# Patient Record
Sex: MALE | Race: WHITE | NOT HISPANIC OR LATINO | Employment: FULL TIME | URBAN - METROPOLITAN AREA
[De-identification: names, ages, dates, MRNs, and addresses within clinical notes are randomized per-mention and may not be internally consistent; named-entity substitution may affect disease eponyms.]

---

## 2017-02-14 RX ORDER — HYDROCHLOROTHIAZIDE 12.5 MG/1
TABLET ORAL
Qty: 30 TABLET | Refills: 3 | Status: SHIPPED | OUTPATIENT
Start: 2017-02-14 | End: 2017-11-03 | Stop reason: SDUPTHER

## 2017-11-03 ENCOUNTER — OFFICE VISIT (OUTPATIENT)
Dept: INTERNAL MEDICINE | Age: 57
End: 2017-11-03

## 2017-11-03 VITALS
WEIGHT: 196.4 LBS | RESPIRATION RATE: 12 BRPM | SYSTOLIC BLOOD PRESSURE: 160 MMHG | DIASTOLIC BLOOD PRESSURE: 90 MMHG | HEART RATE: 72 BPM | HEIGHT: 70 IN | BODY MASS INDEX: 28.12 KG/M2 | OXYGEN SATURATION: 98 %

## 2017-11-03 DIAGNOSIS — E78.2 MIXED HYPERLIPIDEMIA: ICD-10-CM

## 2017-11-03 DIAGNOSIS — R97.20 ELEVATED PSA: ICD-10-CM

## 2017-11-03 DIAGNOSIS — I10 BENIGN ESSENTIAL HYPERTENSION: Primary | ICD-10-CM

## 2017-11-03 LAB
ALBUMIN SERPL-MCNC: 4.5 G/DL (ref 3.5–5.2)
ALBUMIN/GLOB SERPL: 1.7 G/DL
ALP SERPL-CCNC: 64 U/L (ref 39–117)
ALT SERPL-CCNC: 25 U/L (ref 1–41)
AST SERPL-CCNC: 22 U/L (ref 1–40)
BILIRUB SERPL-MCNC: 0.9 MG/DL (ref 0.1–1.2)
BUN SERPL-MCNC: 22 MG/DL (ref 6–20)
BUN/CREAT SERPL: 23.7 (ref 7–25)
CALCIUM SERPL-MCNC: 9.8 MG/DL (ref 8.6–10.5)
CHLORIDE SERPL-SCNC: 102 MMOL/L (ref 98–107)
CHOLEST SERPL-MCNC: 202 MG/DL (ref 0–200)
CO2 SERPL-SCNC: 26.8 MMOL/L (ref 22–29)
CREAT SERPL-MCNC: 0.93 MG/DL (ref 0.76–1.27)
GFR SERPLBLD CREATININE-BSD FMLA CKD-EPI: 101 ML/MIN/1.73
GFR SERPLBLD CREATININE-BSD FMLA CKD-EPI: 83 ML/MIN/1.73
GLOBULIN SER CALC-MCNC: 2.6 GM/DL
GLUCOSE SERPL-MCNC: 83 MG/DL (ref 65–99)
HDLC SERPL-MCNC: 48 MG/DL (ref 40–60)
LDLC SERPL CALC-MCNC: 125 MG/DL (ref 0–100)
POTASSIUM SERPL-SCNC: 4.5 MMOL/L (ref 3.5–5.2)
PROT SERPL-MCNC: 7.1 G/DL (ref 6–8.5)
PSA SERPL-MCNC: 4.14 NG/ML (ref 0–4)
SODIUM SERPL-SCNC: 141 MMOL/L (ref 136–145)
TRIGL SERPL-MCNC: 147 MG/DL (ref 0–150)
VLDLC SERPL CALC-MCNC: 29.4 MG/DL (ref 5–40)

## 2017-11-03 PROCEDURE — 99214 OFFICE O/P EST MOD 30 MIN: CPT | Performed by: INTERNAL MEDICINE

## 2017-11-03 RX ORDER — HYDROCHLOROTHIAZIDE 12.5 MG/1
12.5 TABLET ORAL DAILY
Qty: 90 TABLET | Refills: 1 | Status: SHIPPED | OUTPATIENT
Start: 2017-11-03 | End: 2018-02-27 | Stop reason: SDUPTHER

## 2017-11-03 NOTE — PROGRESS NOTES
"  Grayson Ortega is a 57 y.o. male who presents with   Chief Complaint   Patient presents with   • Hypertension     This is the first visit in the past year for this patient.  He has not been on his hydrochlorothiazide he says for 3 months because he \"ran out\".  He checks his blood pressure at home 1 week out of the month and during that time it ranges [id number] he says.    • Hyperlipidemia     Checkup; lab updates   • Elevated PSA     As above.  The patient had a PSA of 4.5 in 2012 when we were at [facility].  He never had it looked into and has not had any blood test since.  He is having some diminished urine flow at night when he goes to urinate he says.   .    Hypertension   This is a chronic problem. The current episode started more than 1 year ago. The problem has been waxing and waning since onset. The problem is uncontrolled. Past treatments include diuretics (He had been on hydrochlorothiazide 12.5 mg in the past but by his own admission he has not been on any for the past 3 months because he ran out.). Compliance problems: Compliance problem with usage as noted above.    Hyperlipidemia   This is a chronic problem. The current episode started more than 1 year ago. The problem is uncontrolled. He is currently on no antihyperlipidemic treatment.      Elevated PSA: History as outlined above.    The following portions of the patient's history were reviewed and updated as appropriate: allergies, current medications, past medical history and problem list.    Review of Systems   Constitutional: Negative.    HENT: Negative.    Eyes: Negative.    Respiratory: Negative.    Cardiovascular: Negative.    Genitourinary: Negative.    Musculoskeletal: Negative.    Skin: Negative.    Neurological: Negative.    Psychiatric/Behavioral: Negative.        Objective   Physical Exam   Constitutional: He is oriented to person, place, and time. He appears well-developed and well-nourished. No distress.   HENT:   Head: " Normocephalic and atraumatic.   Eyes: Conjunctivae and EOM are normal. Pupils are equal, round, and reactive to light.   Neck: Normal range of motion. Neck supple. No thyromegaly present.   Neck exam negative.  Carotid auscultation normal-no bruits heard.   Cardiovascular: Normal rate, regular rhythm, normal heart sounds and intact distal pulses.  Exam reveals no gallop and no friction rub.    No murmur heard.  Pulmonary/Chest: Effort normal and breath sounds normal. No respiratory distress. He has no wheezes. He has no rales. He exhibits no tenderness.   Neurological: He is alert and oriented to person, place, and time.   Psychiatric: He has a normal mood and affect. His behavior is normal. Judgment and thought content normal.   Nursing note and vitals reviewed.      Assessment/Plan   Grayson was seen today for hypertension, hyperlipidemia and elevated psa.    Diagnoses and all orders for this visit:    Benign essential hypertension  -     Comprehensive Metabolic Panel    Mixed hyperlipidemia  -     Comprehensive Metabolic Panel  -     Lipid Panel    Elevated PSA  -     PSA      Plan:  #1 hypertension: He will resume hydrochlorothiazide, 12.5 mg daily.  We'll see him in 4 weeks for a follow-up blood pressure check and he will continue to monitor his blood pressures at home but will increase the frequency to twice a day over the next month.  Patient made aware of stroke risks with persistently elevated blood pressure.    #2 hyperlipidemia: Labs as above.  Treatment pending lab results.    #3 elevated PSA: PSA 4.5 in 2012 as noted from the history above.  We'll check PSA today.    The patient declines a digital rectal exam and is aware of the potential for prostate cancer with elevation of the PSA.    The patient has not had a screening colonoscopy.  The patient refuses to have one at this time.  The patient is aware of the risks of undiagnosed colon cancer including GI bleed, bowel obstruction, bowel perforation,  metastatic colon cancer and death.  The patient voices an understanding of these risks but also voices a willingness to accept those risks based on the current decision to decline a colonoscopy.

## 2017-11-05 DIAGNOSIS — R97.20 ELEVATED PSA: Primary | ICD-10-CM

## 2017-11-06 NOTE — PROGRESS NOTES
PSA is abnormal at 4.1. Would suggest Urology evaluation. An order has been entered for that referral to Dr Devin Cabral. All other abs are within normal / acceptable ranges. Continue all current meds as they are. A followup visit to be seen for BP reevaluation advised for 4 weeks as discussed..

## 2017-12-04 ENCOUNTER — OFFICE VISIT (OUTPATIENT)
Dept: INTERNAL MEDICINE | Age: 57
End: 2017-12-04

## 2017-12-04 VITALS
HEIGHT: 70 IN | WEIGHT: 198 LBS | DIASTOLIC BLOOD PRESSURE: 74 MMHG | SYSTOLIC BLOOD PRESSURE: 136 MMHG | HEART RATE: 84 BPM | OXYGEN SATURATION: 97 % | TEMPERATURE: 96 F | BODY MASS INDEX: 28.35 KG/M2

## 2017-12-04 DIAGNOSIS — I10 BENIGN ESSENTIAL HYPERTENSION: Primary | ICD-10-CM

## 2017-12-04 PROCEDURE — 99213 OFFICE O/P EST LOW 20 MIN: CPT | Performed by: INTERNAL MEDICINE

## 2017-12-04 NOTE — PROGRESS NOTES
"  Grayson Ortega is a 57 y.o. male who presents with   Chief Complaint   Patient presents with   • Hypertension     Recheck blood pressure.  160/90 in month ago.  He started back on hydrochlorothiazide 12.5 mg daily.  Blood pressure at home since then around 127/77 he says.   .    Hypertension   This is a chronic problem. The current episode started more than 1 year ago. The problem has been waxing and waning since onset. The problem is controlled. Past treatments include diuretics. The current treatment provides moderate improvement. There are no compliance problems.         The following portions of the patient's history were reviewed and updated as appropriate: current medications, past medical history and problem list.    Review of Systems   Constitutional: Negative.    Respiratory: Negative.    Cardiovascular: Negative.    Neurological: Negative.    Psychiatric/Behavioral: Negative.        Objective   Physical Exam   Constitutional: He is oriented to person, place, and time. He appears well-developed and well-nourished.   HENT:   Head: Normocephalic and atraumatic.   Eyes: EOM are normal. Pupils are equal, round, and reactive to light.   Cardiovascular: Normal rate.    Pulmonary/Chest: Effort normal.   Neurological: He is alert and oriented to person, place, and time.   Psychiatric: He has a normal mood and affect. His behavior is normal. Judgment and thought content normal.   Nursing note and vitals reviewed.      Assessment/Plan   Grayson was seen today for hypertension.    Diagnoses and all orders for this visit:    Benign essential hypertension        Plan: Blood pressure seems to have responded favorably to the resumption of hydrochlorothiazide 12.5 mg daily.  Continue all treatment as prescribed.  Follow-up checkup/lab updates in May      The patient also reports that he is to see urology-Dr. Cabral this month \"before [facility]\" to evaluate the PSA of 4.1 and the difficulty with urination that he describes.     "

## 2018-02-27 RX ORDER — HYDROCHLOROTHIAZIDE 12.5 MG/1
TABLET ORAL
Qty: 30 TABLET | Refills: 0 | Status: SHIPPED | OUTPATIENT
Start: 2018-02-27 | End: 2018-03-15 | Stop reason: SDUPTHER

## 2018-03-15 DIAGNOSIS — I10 BENIGN ESSENTIAL HYPERTENSION: Primary | ICD-10-CM

## 2018-03-15 RX ORDER — HYDROCHLOROTHIAZIDE 12.5 MG/1
12.5 TABLET ORAL DAILY
Qty: 30 TABLET | Refills: 0 | Status: SHIPPED | OUTPATIENT
Start: 2018-03-15 | End: 2018-11-21 | Stop reason: SDUPTHER

## 2018-07-02 RX ORDER — HYDROCHLOROTHIAZIDE 12.5 MG/1
TABLET ORAL
Qty: 30 TABLET | Refills: 0 | Status: SHIPPED | OUTPATIENT
Start: 2018-07-02 | End: 2018-08-02 | Stop reason: SDUPTHER

## 2018-08-02 RX ORDER — HYDROCHLOROTHIAZIDE 12.5 MG/1
TABLET ORAL
Qty: 30 TABLET | Refills: 0 | Status: SHIPPED | OUTPATIENT
Start: 2018-08-02 | End: 2018-09-03 | Stop reason: SDUPTHER

## 2018-09-04 RX ORDER — HYDROCHLOROTHIAZIDE 12.5 MG/1
TABLET ORAL
Qty: 30 TABLET | Refills: 0 | Status: SHIPPED | OUTPATIENT
Start: 2018-09-04 | End: 2018-10-05 | Stop reason: SDUPTHER

## 2018-10-05 RX ORDER — HYDROCHLOROTHIAZIDE 12.5 MG/1
TABLET ORAL
Qty: 30 TABLET | Refills: 0 | Status: SHIPPED | OUTPATIENT
Start: 2018-10-05 | End: 2018-11-08 | Stop reason: SDUPTHER

## 2018-11-08 RX ORDER — HYDROCHLOROTHIAZIDE 12.5 MG/1
TABLET ORAL
Qty: 15 TABLET | Refills: 0 | Status: SHIPPED | OUTPATIENT
Start: 2018-11-08 | End: 2019-03-21 | Stop reason: ALTCHOICE

## 2018-11-16 ENCOUNTER — OFFICE VISIT (OUTPATIENT)
Dept: INTERNAL MEDICINE | Age: 58
End: 2018-11-16

## 2018-11-16 VITALS
TEMPERATURE: 97 F | HEART RATE: 95 BPM | DIASTOLIC BLOOD PRESSURE: 80 MMHG | OXYGEN SATURATION: 99 % | SYSTOLIC BLOOD PRESSURE: 148 MMHG | WEIGHT: 195 LBS | HEIGHT: 70 IN | RESPIRATION RATE: 13 BRPM | BODY MASS INDEX: 27.92 KG/M2

## 2018-11-16 DIAGNOSIS — E78.2 MIXED HYPERLIPIDEMIA: ICD-10-CM

## 2018-11-16 DIAGNOSIS — I10 BENIGN ESSENTIAL HYPERTENSION: Primary | ICD-10-CM

## 2018-11-16 DIAGNOSIS — L98.9 SKIN LESION OF FACE: ICD-10-CM

## 2018-11-16 LAB
ALBUMIN SERPL-MCNC: 4.6 G/DL (ref 3.5–5.2)
ALBUMIN/GLOB SERPL: 1.6 G/DL
ALP SERPL-CCNC: 68 U/L (ref 39–117)
ALT SERPL-CCNC: 25 U/L (ref 1–41)
AST SERPL-CCNC: 24 U/L (ref 1–40)
BILIRUB SERPL-MCNC: 0.8 MG/DL (ref 0.1–1.2)
BUN SERPL-MCNC: 18 MG/DL (ref 8–23)
BUN/CREAT SERPL: 18.2 (ref 7–25)
CALCIUM SERPL-MCNC: 10.2 MG/DL (ref 8.6–10.5)
CHLORIDE SERPL-SCNC: 102 MMOL/L (ref 98–107)
CHOLEST SERPL-MCNC: 200 MG/DL (ref 0–200)
CO2 SERPL-SCNC: 29.9 MMOL/L (ref 22–29)
CREAT SERPL-MCNC: 0.99 MG/DL (ref 0.76–1.27)
GLOBULIN SER CALC-MCNC: 2.9 GM/DL
GLUCOSE SERPL-MCNC: 90 MG/DL (ref 65–99)
HDLC SERPL-MCNC: 59 MG/DL (ref 40–60)
LDLC SERPL CALC-MCNC: 117 MG/DL (ref 0–100)
POTASSIUM SERPL-SCNC: 4.6 MMOL/L (ref 3.5–5.2)
PROT SERPL-MCNC: 7.5 G/DL (ref 6–8.5)
SODIUM SERPL-SCNC: 142 MMOL/L (ref 136–145)
TRIGL SERPL-MCNC: 118 MG/DL (ref 0–150)
VLDLC SERPL CALC-MCNC: 23.6 MG/DL (ref 5–40)

## 2018-11-16 PROCEDURE — 99214 OFFICE O/P EST MOD 30 MIN: CPT | Performed by: INTERNAL MEDICINE

## 2018-11-16 RX ORDER — ALFUZOSIN HYDROCHLORIDE 10 MG/1
10 TABLET, EXTENDED RELEASE ORAL DAILY
Qty: 30 TABLET | Refills: 0 | COMMUNITY
Start: 2018-11-16 | End: 2019-05-30

## 2018-11-16 NOTE — PROGRESS NOTES
"  Grayson Ortega is a 58 y.o. male who presents with   Chief Complaint   Patient presents with   • Hypertension     Blood pressure at home [id number] he says.  His daughter is a nurse and takes it for him.    • Hyperlipidemia   • \"Spot on nose\"     Present for several months.  Asymptomatic.  Feels rough.  Will not go away.   • Groin itch   .    58-year-old male.  Six-month checkup/lab update visit.  Has a spot on his nose that has been there for several months and has some itching in his genitalia/groin region.  Also he says that he has not been seen since November 2017.  He did see Dr. Cabral (urology) for elevation of the PSA on last visit.  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 Cabral felt that the PSA elevation might have been from those 2 factors.  He states that he has minimized all of that and as of 1 month ago the PSA was \"less than 4\".  He said Dr. Cabral told him to \"follow up in one year\".      Hypertension   This is a chronic problem. The current episode started more than 1 year ago. The problem has been waxing and waning since onset. Condition status: Based on his home readings, his blood pressure is acceptable.  His readings in the office are consistently elevated which suggests he may have \"white coat hypertension\". Current antihypertension treatment includes diuretics. The current treatment provides moderate improvement. There are no compliance problems.    Hyperlipidemia   This is a chronic problem. The current episode started more than 1 year ago. The problem is controlled. He is currently on no antihyperlipidemic treatment.        The following portions of the patient's history were reviewed and updated as appropriate: allergies, current medications, past medical history and problem list.    Review of Systems   Constitutional: Negative.    HENT: Negative.    Eyes: Negative.    Respiratory: Negative.    Cardiovascular: Negative.    Genitourinary: Negative.  " "  Musculoskeletal: Negative.    Skin: Negative.    Neurological: Negative.    Psychiatric/Behavioral: Negative.        Objective   Physical Exam   Constitutional: He is oriented to person, place, and time. He appears well-developed and well-nourished. No distress.   HENT:   Head: Normocephalic and atraumatic.   Eyes: Conjunctivae and EOM are normal. Pupils are equal, round, and reactive to light.   Neck: Normal range of motion. Neck supple. No thyromegaly present.   Neck exam negative.  Carotid auscultation normal-no bruits heard.   Cardiovascular: Normal rate, regular rhythm, normal heart sounds and intact distal pulses. Exam reveals no gallop and no friction rub.   No murmur heard.  Pulmonary/Chest: Effort normal and breath sounds normal. No respiratory distress. He has no wheezes. He has no rales. He exhibits no tenderness.   Neurological: He is alert and oriented to person, place, and time.   Skin:   On the right side of the bridge of his nose is a 5 mm actinic keratosis-like area that feels rough on palpation.  There is no palpable soreness.   Psychiatric: He has a normal mood and affect. His behavior is normal. Judgment and thought content normal.   Nursing note and vitals reviewed.      Assessment/Plan   Ray was seen today for hypertension, hyperlipidemia, \"spot on nose\" and groin itch.    Diagnoses and all orders for this visit:    Benign essential hypertension  -     Comprehensive Metabolic Panel    Mixed hyperlipidemia  -     Comprehensive Metabolic Panel  -     Lipid Panel    Skin lesion of face      Plan: Labs as above.Today's exam unremarkable for any new problems or events.  Continue all current treatment as prescribed.  A follow-up checkup/lab update visit is advised for 6 months assuming today's labs are all acceptable.    Dermatology evaluation advised for the skin lesion on his nose.  He said his wife sees a dermatology group\" [place] [address]\" and he says \"I will just call and make an " "appointment with them\".  He was advised that if he needs a referral to satisfy insurance requirements then let us know and we will enter that referral for him.         "

## 2018-11-21 DIAGNOSIS — I10 BENIGN ESSENTIAL HYPERTENSION: ICD-10-CM

## 2018-11-21 RX ORDER — HYDROCHLOROTHIAZIDE 12.5 MG/1
12.5 TABLET ORAL DAILY
Qty: 90 TABLET | Refills: 1 | Status: SHIPPED | OUTPATIENT
Start: 2018-11-21 | End: 2019-03-07 | Stop reason: HOSPADM

## 2018-12-28 ENCOUNTER — TELEPHONE (OUTPATIENT)
Dept: INTERNAL MEDICINE | Age: 58
End: 2018-12-28

## 2018-12-28 DIAGNOSIS — S00.31XD ABRASION OF NOSE, SUBSEQUENT ENCOUNTER: Primary | ICD-10-CM

## 2018-12-28 NOTE — TELEPHONE ENCOUNTER
Pt needs a referral for dermatology for the spot on his nose. He was unable to get in where is wife is seen.     Please advise.

## 2019-02-28 ENCOUNTER — APPOINTMENT (OUTPATIENT)
Dept: CT IMAGING | Facility: HOSPITAL | Age: 59
End: 2019-02-28

## 2019-02-28 ENCOUNTER — HOSPITAL ENCOUNTER (EMERGENCY)
Facility: HOSPITAL | Age: 59
Discharge: HOME | End: 2019-02-28
Attending: EMERGENCY MEDICINE | Admitting: EMERGENCY MEDICINE

## 2019-02-28 VITALS
OXYGEN SATURATION: 100 % | HEART RATE: 82 BPM | RESPIRATION RATE: 22 BRPM | TEMPERATURE: 96.9 F | WEIGHT: 195 LBS | HEIGHT: 70 IN | DIASTOLIC BLOOD PRESSURE: 95 MMHG | SYSTOLIC BLOOD PRESSURE: 164 MMHG | BODY MASS INDEX: 27.92 KG/M2

## 2019-02-28 DIAGNOSIS — N20.0 KIDNEY STONE ON RIGHT SIDE: Primary | ICD-10-CM

## 2019-02-28 LAB
ALBUMIN SERPL-MCNC: 4.4 G/DL (ref 3.5–5.2)
ALBUMIN/GLOB SERPL: 1.6 G/DL
ALP SERPL-CCNC: 64 U/L (ref 39–117)
ALT SERPL W P-5'-P-CCNC: 37 U/L (ref 1–41)
ANION GAP SERPL CALCULATED.3IONS-SCNC: 13 MMOL/L
AST SERPL-CCNC: 30 U/L (ref 1–40)
BASOPHILS # BLD AUTO: 0.04 10*3/MM3 (ref 0–0.2)
BASOPHILS NFR BLD AUTO: 0.4 % (ref 0–1.5)
BILIRUB SERPL-MCNC: 0.6 MG/DL (ref 0.1–1.2)
BUN BLD-MCNC: 24 MG/DL (ref 8–23)
BUN/CREAT SERPL: 21.4 (ref 7–25)
CALCIUM SPEC-SCNC: 9.6 MG/DL (ref 8.6–10.5)
CHLORIDE SERPL-SCNC: 100 MMOL/L (ref 98–107)
CO2 SERPL-SCNC: 24 MMOL/L (ref 22–29)
CREAT BLD-MCNC: 1.12 MG/DL (ref 0.76–1.27)
DEPRECATED RDW RBC AUTO: 41.1 FL (ref 37–54)
EOSINOPHIL # BLD AUTO: 0.13 10*3/MM3 (ref 0–0.4)
EOSINOPHIL NFR BLD AUTO: 1.3 % (ref 0.3–6.2)
ERYTHROCYTE [DISTWIDTH] IN BLOOD BY AUTOMATED COUNT: 12.8 % (ref 12.3–15.4)
GFR SERPL CREATININE-BSD FRML MDRD: 67 ML/MIN/1.73
GLOBULIN UR ELPH-MCNC: 2.8 GM/DL
GLUCOSE BLD-MCNC: 114 MG/DL (ref 65–99)
HCT VFR BLD AUTO: 47.9 % (ref 37.5–51)
HGB BLD-MCNC: 16.4 G/DL (ref 13–17.7)
IMM GRANULOCYTES # BLD AUTO: 0.05 10*3/MM3 (ref 0–0.05)
IMM GRANULOCYTES NFR BLD AUTO: 0.5 % (ref 0–0.5)
LIPASE SERPL-CCNC: 17 U/L (ref 13–60)
LYMPHOCYTES # BLD AUTO: 1.53 10*3/MM3 (ref 0.7–3.1)
LYMPHOCYTES NFR BLD AUTO: 15.7 % (ref 19.6–45.3)
MCH RBC QN AUTO: 30.4 PG (ref 26.6–33)
MCHC RBC AUTO-ENTMCNC: 34.2 G/DL (ref 31.5–35.7)
MCV RBC AUTO: 88.7 FL (ref 79–97)
MONOCYTES # BLD AUTO: 0.57 10*3/MM3 (ref 0.1–0.9)
MONOCYTES NFR BLD AUTO: 5.8 % (ref 5–12)
NEUTROPHILS # BLD AUTO: 7.43 10*3/MM3 (ref 1.4–7)
NEUTROPHILS NFR BLD AUTO: 76.3 % (ref 42.7–76)
NRBC BLD AUTO-RTO: 0 /100 WBC (ref 0–0)
PLATELET # BLD AUTO: 223 10*3/MM3 (ref 140–450)
PMV BLD AUTO: 10.2 FL (ref 6–12)
POTASSIUM BLD-SCNC: 4.1 MMOL/L (ref 3.5–5.2)
PROT SERPL-MCNC: 7.2 G/DL (ref 6–8.5)
RBC # BLD AUTO: 5.4 10*6/MM3 (ref 4.14–5.8)
SODIUM BLD-SCNC: 137 MMOL/L (ref 136–145)
WBC NRBC COR # BLD: 9.75 10*3/MM3 (ref 3.4–10.8)

## 2019-02-28 PROCEDURE — 85025 COMPLETE CBC W/AUTO DIFF WBC: CPT | Performed by: EMERGENCY MEDICINE

## 2019-02-28 PROCEDURE — 96374 THER/PROPH/DIAG INJ IV PUSH: CPT

## 2019-02-28 PROCEDURE — 96375 TX/PRO/DX INJ NEW DRUG ADDON: CPT

## 2019-02-28 PROCEDURE — 25010000002 KETOROLAC TROMETHAMINE PER 15 MG: Performed by: EMERGENCY MEDICINE

## 2019-02-28 PROCEDURE — 83690 ASSAY OF LIPASE: CPT | Performed by: EMERGENCY MEDICINE

## 2019-02-28 PROCEDURE — 74176 CT ABD & PELVIS W/O CONTRAST: CPT

## 2019-02-28 PROCEDURE — 25010000002 HYDROMORPHONE PER 4 MG: Performed by: EMERGENCY MEDICINE

## 2019-02-28 PROCEDURE — 80053 COMPREHEN METABOLIC PANEL: CPT | Performed by: EMERGENCY MEDICINE

## 2019-02-28 PROCEDURE — 99283 EMERGENCY DEPT VISIT LOW MDM: CPT

## 2019-02-28 RX ORDER — SODIUM CHLORIDE 0.9 % (FLUSH) 0.9 %
10 SYRINGE (ML) INJECTION AS NEEDED
Status: DISCONTINUED | OUTPATIENT
Start: 2019-02-28 | End: 2019-02-28 | Stop reason: HOSPADM

## 2019-02-28 RX ORDER — HYDROMORPHONE HYDROCHLORIDE 1 MG/ML
0.5 INJECTION, SOLUTION INTRAMUSCULAR; INTRAVENOUS; SUBCUTANEOUS ONCE
Status: COMPLETED | OUTPATIENT
Start: 2019-02-28 | End: 2019-02-28

## 2019-02-28 RX ORDER — ONDANSETRON HYDROCHLORIDE 8 MG/1
8 TABLET, FILM COATED ORAL EVERY 8 HOURS PRN
Qty: 15 TABLET | Refills: 0 | Status: SHIPPED | OUTPATIENT
Start: 2019-02-28 | End: 2019-05-30

## 2019-02-28 RX ORDER — KETOROLAC TROMETHAMINE 30 MG/ML
30 INJECTION, SOLUTION INTRAMUSCULAR; INTRAVENOUS ONCE
Status: COMPLETED | OUTPATIENT
Start: 2019-02-28 | End: 2019-02-28

## 2019-02-28 RX ORDER — HYDROCODONE BITARTRATE AND ACETAMINOPHEN 5; 325 MG/1; MG/1
1 TABLET ORAL EVERY 6 HOURS PRN
Qty: 20 TABLET | Refills: 0 | Status: ON HOLD | OUTPATIENT
Start: 2019-02-28 | End: 2019-03-07

## 2019-02-28 RX ADMIN — KETOROLAC TROMETHAMINE 30 MG: 30 INJECTION, SOLUTION INTRAMUSCULAR at 17:53

## 2019-02-28 RX ADMIN — HYDROMORPHONE HYDROCHLORIDE 0.5 MG: 1 INJECTION, SOLUTION INTRAMUSCULAR; INTRAVENOUS; SUBCUTANEOUS at 18:33

## 2019-03-01 ENCOUNTER — TELEPHONE (OUTPATIENT)
Dept: INTERNAL MEDICINE | Age: 59
End: 2019-03-01

## 2019-03-01 NOTE — TELEPHONE ENCOUNTER
Patient went to the ER, found out he has kidney stones.  ER suggested he follow up with either his Urologist or PCP.  Patient wanted to know if Dr. Brower wanted him to come into the office or follow up with Urology.  Please advise.  Patient can be contacted at [phone]

## 2019-03-06 ENCOUNTER — HOSPITAL ENCOUNTER (OUTPATIENT)
Dept: OTHER | Facility: HOSPITAL | Age: 59
Setting detail: SPECIMEN
Discharge: HOME | End: 2019-03-06
Attending: UROLOGY | Admitting: UROLOGY

## 2019-03-06 ENCOUNTER — HOSPITAL ENCOUNTER (OUTPATIENT)
Facility: HOSPITAL | Age: 59
Setting detail: OBSERVATION
Discharge: HOME | End: 2019-03-07
Attending: EMERGENCY MEDICINE | Admitting: HOSPITALIST

## 2019-03-06 DIAGNOSIS — N39.0 ACUTE UTI: ICD-10-CM

## 2019-03-06 DIAGNOSIS — R33.9 URINARY RETENTION: Primary | ICD-10-CM

## 2019-03-06 DIAGNOSIS — R31.9 HEMATURIA, UNSPECIFIED TYPE: ICD-10-CM

## 2019-03-06 LAB
BACTERIA UR QL AUTO: ABNORMAL /HPF
BILIRUB UR QL STRIP: NEGATIVE
CLARITY UR: ABNORMAL
COLOR UR: ABNORMAL
GLUCOSE UR STRIP-MCNC: ABNORMAL MG/DL
HGB UR QL STRIP.AUTO: ABNORMAL
HYALINE CASTS UR QL AUTO: ABNORMAL /LPF
KETONES UR QL STRIP: ABNORMAL
LEUKOCYTE ESTERASE UR QL STRIP.AUTO: ABNORMAL
NITRITE UR QL STRIP: POSITIVE
PH UR STRIP.AUTO: 7 [PH] (ref 5–8)
PROT UR QL STRIP: ABNORMAL
RBC # UR: ABNORMAL /HPF
REF LAB TEST METHOD: ABNORMAL
SP GR UR STRIP: 1.01 (ref 1–1.03)
SQUAMOUS #/AREA URNS HPF: ABNORMAL /HPF
UROBILINOGEN UR QL STRIP: ABNORMAL
WBC UR QL AUTO: ABNORMAL /HPF

## 2019-03-06 PROCEDURE — 81001 URINALYSIS AUTO W/SCOPE: CPT | Performed by: PHYSICIAN ASSISTANT

## 2019-03-06 PROCEDURE — 99284 EMERGENCY DEPT VISIT MOD MDM: CPT

## 2019-03-06 PROCEDURE — G0378 HOSPITAL OBSERVATION PER HR: HCPCS

## 2019-03-06 PROCEDURE — 51798 US URINE CAPACITY MEASURE: CPT

## 2019-03-06 PROCEDURE — 51702 INSERT TEMP BLADDER CATH: CPT

## 2019-03-06 RX ORDER — OXYCODONE HYDROCHLORIDE AND ACETAMINOPHEN 5; 325 MG/1; MG/1
2 TABLET ORAL ONCE
Status: COMPLETED | OUTPATIENT
Start: 2019-03-06 | End: 2019-03-06

## 2019-03-06 RX ADMIN — OXYCODONE AND ACETAMINOPHEN 2 TABLET: 5; 325 TABLET ORAL at 23:50

## 2019-03-07 VITALS
WEIGHT: 203.3 LBS | SYSTOLIC BLOOD PRESSURE: 111 MMHG | BODY MASS INDEX: 29.11 KG/M2 | RESPIRATION RATE: 18 BRPM | HEART RATE: 79 BPM | TEMPERATURE: 98.3 F | OXYGEN SATURATION: 95 % | HEIGHT: 70 IN | DIASTOLIC BLOOD PRESSURE: 65 MMHG

## 2019-03-07 PROBLEM — R31.0 GROSS HEMATURIA: Status: ACTIVE | Noted: 2019-03-07

## 2019-03-07 PROBLEM — R33.9 URINARY RETENTION: Status: ACTIVE | Noted: 2019-03-07

## 2019-03-07 PROBLEM — N39.0 ACUTE UTI (URINARY TRACT INFECTION): Status: ACTIVE | Noted: 2019-03-07

## 2019-03-07 LAB
ANION GAP SERPL CALCULATED.3IONS-SCNC: 12.9 MMOL/L
BUN BLD-MCNC: 24 MG/DL (ref 8–23)
BUN/CREAT SERPL: 22.2 (ref 7–25)
CALCIUM SPEC-SCNC: 9.3 MG/DL (ref 8.6–10.5)
CHLORIDE SERPL-SCNC: 100 MMOL/L (ref 98–107)
CO2 SERPL-SCNC: 25.1 MMOL/L (ref 22–29)
CREAT BLD-MCNC: 1.08 MG/DL (ref 0.76–1.27)
DEPRECATED RDW RBC AUTO: 40.4 FL (ref 37–54)
ERYTHROCYTE [DISTWIDTH] IN BLOOD BY AUTOMATED COUNT: 12.3 % (ref 12.3–15.4)
GFR SERPL CREATININE-BSD FRML MDRD: 70 ML/MIN/1.73
GLUCOSE BLD-MCNC: 126 MG/DL (ref 65–99)
HCT VFR BLD AUTO: 44.8 % (ref 37.5–51)
HGB BLD-MCNC: 15 G/DL (ref 13–17.7)
MCH RBC QN AUTO: 29.9 PG (ref 26.6–33)
MCHC RBC AUTO-ENTMCNC: 33.5 G/DL (ref 31.5–35.7)
MCV RBC AUTO: 89.2 FL (ref 79–97)
PLATELET # BLD AUTO: 254 10*3/MM3 (ref 140–450)
PMV BLD AUTO: 10.1 FL (ref 6–12)
POTASSIUM BLD-SCNC: 4.2 MMOL/L (ref 3.5–5.2)
RBC # BLD AUTO: 5.02 10*6/MM3 (ref 4.14–5.8)
SODIUM BLD-SCNC: 138 MMOL/L (ref 136–145)
SPECIMEN SOURCE: NORMAL
WBC NRBC COR # BLD: 11.03 10*3/MM3 (ref 3.4–10.8)

## 2019-03-07 PROCEDURE — 87086 URINE CULTURE/COLONY COUNT: CPT | Performed by: PHYSICIAN ASSISTANT

## 2019-03-07 PROCEDURE — G0378 HOSPITAL OBSERVATION PER HR: HCPCS

## 2019-03-07 PROCEDURE — 25010000002 CEFTRIAXONE PER 250 MG: Performed by: PHYSICIAN ASSISTANT

## 2019-03-07 PROCEDURE — 96361 HYDRATE IV INFUSION ADD-ON: CPT

## 2019-03-07 PROCEDURE — 80048 BASIC METABOLIC PNL TOTAL CA: CPT | Performed by: NURSE PRACTITIONER

## 2019-03-07 PROCEDURE — 51702 INSERT TEMP BLADDER CATH: CPT

## 2019-03-07 PROCEDURE — 96365 THER/PROPH/DIAG IV INF INIT: CPT

## 2019-03-07 PROCEDURE — 85027 COMPLETE CBC AUTOMATED: CPT | Performed by: NURSE PRACTITIONER

## 2019-03-07 RX ORDER — SODIUM CHLORIDE 0.9 % (FLUSH) 0.9 %
3 SYRINGE (ML) INJECTION EVERY 12 HOURS SCHEDULED
Status: DISCONTINUED | OUTPATIENT
Start: 2019-03-07 | End: 2019-03-07 | Stop reason: HOSPADM

## 2019-03-07 RX ORDER — FINASTERIDE 5 MG/1
5 TABLET, FILM COATED ORAL DAILY
Status: DISCONTINUED | OUTPATIENT
Start: 2019-03-07 | End: 2019-03-07 | Stop reason: HOSPADM

## 2019-03-07 RX ORDER — CEFDINIR 300 MG/1
300 CAPSULE ORAL 2 TIMES DAILY
Qty: 10 CAPSULE | Refills: 0 | Status: SHIPPED | OUTPATIENT
Start: 2019-03-07 | End: 2019-05-30

## 2019-03-07 RX ORDER — TAMSULOSIN HYDROCHLORIDE 0.4 MG/1
0.4 CAPSULE ORAL NIGHTLY
Status: DISCONTINUED | OUTPATIENT
Start: 2019-03-07 | End: 2019-03-07 | Stop reason: HOSPADM

## 2019-03-07 RX ORDER — ONDANSETRON 2 MG/ML
4 INJECTION INTRAMUSCULAR; INTRAVENOUS EVERY 6 HOURS PRN
Status: DISCONTINUED | OUTPATIENT
Start: 2019-03-07 | End: 2019-03-07 | Stop reason: HOSPADM

## 2019-03-07 RX ORDER — ONDANSETRON 4 MG/1
4 TABLET, FILM COATED ORAL EVERY 6 HOURS PRN
Status: DISCONTINUED | OUTPATIENT
Start: 2019-03-07 | End: 2019-03-07 | Stop reason: HOSPADM

## 2019-03-07 RX ORDER — CEFTRIAXONE SODIUM 1 G/50ML
1 INJECTION, SOLUTION INTRAVENOUS ONCE
Status: COMPLETED | OUTPATIENT
Start: 2019-03-07 | End: 2019-03-07

## 2019-03-07 RX ORDER — SODIUM CHLORIDE 0.9 % (FLUSH) 0.9 %
1-10 SYRINGE (ML) INJECTION AS NEEDED
Status: DISCONTINUED | OUTPATIENT
Start: 2019-03-07 | End: 2019-03-07 | Stop reason: HOSPADM

## 2019-03-07 RX ORDER — CEFDINIR 300 MG/1
300 CAPSULE ORAL 2 TIMES DAILY
Qty: 10 CAPSULE | Refills: 0 | Status: SHIPPED | OUTPATIENT
Start: 2019-03-07 | End: 2019-03-07 | Stop reason: SDUPTHER

## 2019-03-07 RX ORDER — ACETAMINOPHEN 325 MG/1
650 TABLET ORAL EVERY 4 HOURS PRN
Status: DISCONTINUED | OUTPATIENT
Start: 2019-03-07 | End: 2019-03-07 | Stop reason: HOSPADM

## 2019-03-07 RX ORDER — FINASTERIDE 5 MG/1
5 TABLET, FILM COATED ORAL DAILY
Qty: 30 TABLET | Refills: 0 | Status: SHIPPED | OUTPATIENT
Start: 2019-03-08 | End: 2019-05-30

## 2019-03-07 RX ORDER — ONDANSETRON 4 MG/1
4 TABLET, ORALLY DISINTEGRATING ORAL EVERY 6 HOURS PRN
Status: DISCONTINUED | OUTPATIENT
Start: 2019-03-07 | End: 2019-03-07 | Stop reason: HOSPADM

## 2019-03-07 RX ORDER — SODIUM CHLORIDE 9 MG/ML
75 INJECTION, SOLUTION INTRAVENOUS CONTINUOUS
Status: DISCONTINUED | OUTPATIENT
Start: 2019-03-07 | End: 2019-03-07

## 2019-03-07 RX ORDER — L.ACID,PARA/B.BIFIDUM/S.THERM 8B CELL
1 CAPSULE ORAL DAILY
Status: DISCONTINUED | OUTPATIENT
Start: 2019-03-07 | End: 2019-03-07 | Stop reason: HOSPADM

## 2019-03-07 RX ADMIN — CEFTRIAXONE SODIUM 1 G: 1 INJECTION, SOLUTION INTRAVENOUS at 03:08

## 2019-03-07 RX ADMIN — Medication 1 CAPSULE: at 11:05

## 2019-03-07 RX ADMIN — Medication 3 ML: at 05:17

## 2019-03-07 RX ADMIN — FINASTERIDE 5 MG: 5 TABLET, FILM COATED ORAL at 11:05

## 2019-03-07 RX ADMIN — SODIUM CHLORIDE 75 ML/HR: 9 INJECTION, SOLUTION INTRAVENOUS at 05:41

## 2019-03-07 NOTE — PLAN OF CARE
Problem: Patient Care Overview  Goal: Plan of Care Review  Outcome: Ongoing (interventions implemented as appropriate)   03/07/19 0762   Coping/Psychosocial   Plan of Care Reviewed With patient;spouse   Plan of Care Review   Progress no change   OTHER   Outcome Summary Report recvd from Pippa MARTINEZ, pt admitted to 4P from ER. Pt and spouse welcomed and oriented to unit, questions, comments, concerns addressed. Pt arrived to unit with F/C and CBI. Pt NPO, IVF continuous. Pt agrees to notify staff of needs. Continue to monitor and tx per POC and MD orders.        Problem: Pain, Acute (Adult)  Goal: Identify Related Risk Factors and Signs and Symptoms  Outcome: Ongoing (interventions implemented as appropriate)    Goal: Acceptable Pain Control/Comfort Level  Outcome: Ongoing (interventions implemented as appropriate)      Problem: Urine Elimination Impaired (Adult)  Goal: Identify Related Risk Factors and Signs and Symptoms  Outcome: Outcome(s) achieved Date Met: 03/07/19    Goal: Effective or Improved Urinary Elimination  Outcome: Ongoing (interventions implemented as appropriate)    Goal: Effective Containment of Urine  Outcome: Ongoing (interventions implemented as appropriate)    Goal: Reduced Incontinence Episodes  Outcome: Ongoing (interventions implemented as appropriate)

## 2019-03-07 NOTE — ED NOTES
3000 mL bag normal saline instilled into bladder during CBI. Drainage bag emptied. 3050mL dark orange fluid returned. SHARON Larios notified. Second 3000mL bag NS started.             Pippa Cunningham RN  03/07/19 0043

## 2019-03-07 NOTE — CONSULTS
FIRST UROLOGY CONSULT      Patient Identification:  NAME:  Caio Ortega  Age:  59 y.o.   Sex:  male   :  1960   MRN:  [id number]       Chief complaint: Urinary retention    History of present illness:  This is a 59-year-old man who underwent ureteroscopy, laser lithotripsy, and ureteral stent placement yesterday with Dr. Mcnamara. Postoperatively, he was unable to urinate and presented to the ER. A rodriguez catheter was placed, with difficulty, and bright red colored urine was drained. A 16 Fr 3 way was left in place.     The patient is currently comfortable. He reports being told he has a large prostate. No fevers, chills, nausea, vomiting.    Past medical history:  Past Medical History:   Diagnosis Date   • Allergic    • BPH (benign prostatic hypertrophy)    • Difficulty in urination     SOMETIMES AT NIGHT   • HL (hearing loss)    • Hyperlipidemia    • Hypertension    • Inguinal hernia    • Visual impairment        Past surgical history:  Past Surgical History:   Procedure Laterality Date   • HERNIA REPAIR     • INGUINAL HERNIA REPAIR Left 2016    Procedure: INGUINAL HERNIA REPAIR LAPAROSCOPIC;  Surgeon: Keron Pérez MD;  Location: [facility] [place];  Service:    • INGUINAL HERNIA REPAIR         Allergies:  Codeine and Oxycodone    Home medications:  Medications Prior to Admission   Medication Sig Dispense Refill Last Dose   • alfuzosin (UROXATRAL) 10 MG 24 hr tablet Take 1 tablet by mouth Daily. 30 tablet 0 2019 at Unknown time   • Aspirin (ASPIR-81 PO) Take 81 mg by mouth Daily.   Taking   • Cholecalciferol 1000 UNITS capsule Take  by mouth.   Taking   • Digestive Enzymes (PAPAYA ENZYME) tablet Take 1 tablet by mouth Daily.   2019 at Unknown time   • hydrochlorothiazide (HYDRODIURIL) 12.5 MG tablet TAKE ONE TABLET BY MOUTH DAILY 15 tablet 0 2019 at Unknown time   • hydrochlorothiazide (HYDRODIURIL) 12.5 MG tablet Take 1 tablet by mouth Daily. 90 tablet 1    •  HYDROcodone-acetaminophen (NORCO) 5-325 MG per tablet Take 1 tablet by mouth Every 6 (Six) Hours As Needed for Severe Pain . 20 tablet 0    • MARIELA ROOT PO Take 1 tablet by mouth Daily.   Taking   • MULTIPLE VITAMIN PO Take  by mouth.   2019 at Unknown time   • OMEGA-3 FATTY ACIDS PO Take 1 tablet by mouth Daily.   Taking   • ondansetron (ZOFRAN) 8 MG tablet Take 1 tablet by mouth Every 8 (Eight) Hours As Needed for Nausea. 15 tablet 0    • PROBIOTIC PRODUCT PO Take 1 tablet by mouth Daily.   2019 at Unknown time   • LAN MAR, PO Take 1 tablet by mouth Daily.   2019 at Unknown time        Hospital medications:    sodium chloride 3 mL Intravenous Q12H       sodium chloride 75 mL/hr Last Rate: 75 mL/hr (19 0541)     •  acetaminophen  •  ondansetron **OR** ondansetron ODT **OR** ondansetron  •  sodium chloride    Family history:  Family History   Problem Relation Age of Onset   • Cancer Mother    • Hypertension Mother    • Cancer Father    • Hypertension Father        Social history:  Social History     Tobacco Use   • Smoking status: Never Smoker   • Smokeless tobacco: Never Used   Substance Use Topics   • Alcohol use: Yes     Types: 3 - 4 Cans of beer, 1 Standard drinks or equivalent per week     Comment: 1 drink per day   • Drug use: No       Review of systems:    Negative 12-system ROS except for the following:  none      Objective:  TMax 24 hours:   Temp (24hrs), Av.8 °F (36.6 °C), Min:97.3 °F (36.3 °C), Max:98.3 °F (36.8 °C)      Vitals Ranges:   Temp:  [97.3 °F (36.3 °C)-98.3 °F (36.8 °C)] 98.3 °F (36.8 °C)  Heart Rate:  [] 79  Resp:  [18-20] 18  BP: (108-146)/(63-78) 111/65    Intake/Output Last 3 shifts:  I/O last 3 completed shifts:  In: 6050 [Other:6000; IV Piggyback:50]  Out: 8750 [Urine:8750]     Physical Exam:       General Appearance:    Alert, cooperative, in no acute distress   Head:    Normocephalic, without obvious abnormality, atraumatic   Eyes:           PERRL, conjunctivae and corneas clear   Ears:    Normal external inspection       Neck:   Supple, no LAD, trachea midline   Back:     No CVA tenderness   Lungs:     Respirations unlabored, symmetric excursion    Heart:    RRR, intact peripheral pulses   Abdomen:     Soft, NDNT, no masses, no guarding   :    Penis normal.  No scrotal or penile rashes noted. Balderas catheter intact draining crystal clear urine   Extremities:   No edema, no deformity   Skin:   No bleeding, bruising or rashes   Neuro/Psych:   Orientation intact, mood/affect pleasant, no focal findings       Results review:   I reviewed the patient's new clinical results.    Data review:  Lab Results (last 24 hours)     Procedure Component Value Units Date/Time    Urine Culture - Urine, Urine, Catheter [[id number]] Collected:  03/07/19 0419    Specimen:  Urine, Catheter Updated:  03/07/19 0421    Basic Metabolic Panel [[id number]]  (Abnormal) Collected:  03/07/19 0302    Specimen:  Blood Updated:  03/07/19 0406     Glucose 126 mg/dL      BUN 24 mg/dL      Creatinine 1.08 mg/dL      Sodium 138 mmol/L      Potassium 4.2 mmol/L      Chloride 100 mmol/L      CO2 25.1 mmol/L      Calcium 9.3 mg/dL      eGFR Non African Amer 70 mL/min/1.73      BUN/Creatinine Ratio 22.2     Anion Gap 12.9 mmol/L     Narrative:       GFR Normal >60  Chronic Kidney Disease <60  Kidney Failure <15    CBC (No Diff) [[id number]]  (Abnormal) Collected:  03/07/19 0302    Specimen:  Blood Updated:  03/07/19 0309     WBC 11.03 10*3/mm3      RBC 5.02 10*6/mm3      Hemoglobin 15.0 g/dL      Hematocrit 44.8 %      MCV 89.2 fL      MCH 29.9 pg      MCHC 33.5 g/dL      RDW 12.3 %      RDW-SD 40.4 fl      MPV 10.1 fL      Platelets 254 10*3/mm3     Urinalysis, Microscopic Only - Urine, Catheter [[id number]]  (Abnormal) Collected:  03/06/19 2158    Specimen:  Urine, Catheter Updated:  03/06/19 2239     RBC, UA Too Numerous to Count /HPF      WBC, UA       Unable to determine due to loaded  field     /HPF     Bacteria, UA       Unable to determine due to loaded field     /HPF     Squamous Epithelial Cells, UA       Unable to determine due to loaded field     /HPF     Hyaline Casts, UA       Unable to determine due to loaded field     /LPF     Methodology Manual Light Microscopy    Urinalysis With Microscopic If Indicated (No Culture) - Urine, Catheter [[id number]]  (Abnormal) Collected:  03/06/19 2158    Specimen:  Urine, Catheter Updated:  03/06/19 2236     Color, UA Red     Comment: Any Substance that causes an abnormal urine color can alter the accuracy of the chemical reactions.        Appearance, UA Turbid     pH, UA 7.0     Specific Gravity, UA 1.015     Glucose,  mg/dL (Trace)     Ketones, UA 15 mg/dL (1+)     Bilirubin, UA Negative     Blood, UA Large (3+)     Protein, UA >=300 mg/dL (3+)     Leuk Esterase, UA Moderate (2+)     Nitrite, UA Positive     Urobilinogen, UA 4.0 E.U./dL    Narrative:       Due to the grossly bloody appearance of the specimen, the specimen was centrifuged prior to the dipstick analysis.           Imaging:  Imaging Results (last 24 hours)     ** No results found for the last 24 hours. **             Assessment:       Benign essential hypertension    Hyperlipidemia    Urinary retention    Gross hematuria    Plan:     - voiding trial  - finasteride x 5 days    Devin Hardin Jr., MD  03/07/19  7:32 AM

## 2019-03-07 NOTE — ED PROVIDER NOTES
Pt presents to the ED complaining urinary retention for the past 5 hours following urinary stent placed today following lithotripsy for nephrolithiasis.     On exam pt has rodriguez in place with grossly bloody urine despite 3L of irrigation.     I agree with midlevel plan to discuss case with urology for possible plan for admission and extensive irrigation.     The MATT and I have discussed this patient's history, physical exam, and treatment plan.  I have reviewed the documentation and personally had a face to face interaction with the patient. I affirm the documentation and agree with the treatment and plan.  The attached note describes my personal findings.    Documentation assistance provided by yecenia Carr for Dr. Hartman.  Information recorded by the scribe was done at my direction and has been verified and validated by me.       Jessa Carr  03/06/19 1377       Shayne Hartman MD  03/07/19 3421

## 2019-03-07 NOTE — ED PROVIDER NOTES
EMERGENCY DEPARTMENT ENCOUNTER    Room Number:  P484/1  Date seen:  3/8/2019  Time seen: 9:20 PM  PCP: Bebo Brower MD    HPI:  Chief complaint: Urinary retention  Context:Caio Ortega is a 59 y.o. male who presents to the ED with c/o urinary retention that began 5 hours ago. Pt also c/o R suprapubic abd pain and hematuria. Pt had a lithotripsy and a stent placed today.    Onset: Gradual  Location:   Duration: Began 5 hours ago  Timing: Constant  Severity: Moderate    ALLERGIES  Codeine and Oxycodone    PAST MEDICAL HISTORY  Active Ambulatory Problems     Diagnosis Date Noted   • Benign essential hypertension 05/01/2016   • Hyperlipidemia 05/01/2016   • Elevated PSA 01/23/2013     Resolved Ambulatory Problems     Diagnosis Date Noted   • No Resolved Ambulatory Problems     Past Medical History:   Diagnosis Date   • Acute UTI (urinary tract infection) 3/7/2019   • Allergic    • BPH (benign prostatic hypertrophy)    • Difficulty in urination    • Hematuria    • HL (hearing loss)    • Hyperlipidemia    • Hypertension    • Inguinal hernia    • Visual impairment        PAST SURGICAL HISTORY  Past Surgical History:   Procedure Laterality Date   • HERNIA REPAIR     • INGUINAL HERNIA REPAIR Left 11/18/2016    Procedure: INGUINAL HERNIA REPAIR LAPAROSCOPIC;  Surgeon: Keron Pérez MD;  [place] [facility] [place] [facility] [place]  Service:    • INGUINAL HERNIA REPAIR         FAMILY HISTORY  Family History   Problem Relation Age of Onset   • Cancer Mother    • Hypertension Mother    • Cancer Father    • Hypertension Father        SOCIAL HISTORY  Social History     Socioeconomic History   • Marital status:      Spouse name: Not on file   • Number of children: Not on file   • Years of education: Not on file   • Highest education level: Not on file   Social Needs   • Financial resource strain: Not on file   • Food insecurity - worry: Not on file   • Food insecurity - inability: Not on file   • Transportation needs - medical: Not  on file   • Transportation needs - non-medical: Not on file   Occupational History   • Not on file   Tobacco Use   • Smoking status: Never Smoker   • Smokeless tobacco: Never Used   Substance and Sexual Activity   • Alcohol use: Yes     Types: 3 - 4 Cans of beer, 1 Standard drinks or equivalent per week     Comment: 1 drink per day   • Drug use: No   • Sexual activity: Yes     Partners: Female   Other Topics Concern   • Not on file   Social History Narrative   • Not on file       REVIEW OF SYSTEMS  Review of Systems   Constitutional: Negative for activity change, appetite change and fever.   HENT: Negative for congestion and sore throat.    Eyes: Negative.    Respiratory: Negative for cough and shortness of breath.    Cardiovascular: Negative for chest pain and leg swelling.   Gastrointestinal: Positive for abdominal pain (R suprapubic). Negative for diarrhea and vomiting.   Endocrine: Negative.    Genitourinary: Positive for difficulty urinating (retention) and hematuria. Negative for decreased urine volume and dysuria.   Musculoskeletal: Negative for neck pain.   Skin: Negative for rash and wound.   Allergic/Immunologic: Negative.    Neurological: Negative for weakness, numbness and headaches.   Hematological: Negative.    Psychiatric/Behavioral: Negative.    All other systems reviewed and are negative.      PHYSICAL EXAM  ED Triage Vitals [03/06/19 2055]   Temp Heart Rate Resp BP SpO2   97.3 °F (36.3 °C) 106 20 -- 98 %      Temp src Heart Rate Source Patient Position BP Location FiO2 (%)   Tympanic Monitor -- -- --     Physical Exam   Constitutional: He is oriented to person, place, and time. No distress.   HENT:   Head: Normocephalic and atraumatic.   Eyes: EOM are normal. Pupils are equal, round, and reactive to light.   Neck: Normal range of motion. Neck supple.   Cardiovascular: Normal rate, regular rhythm and normal heart sounds.   Pulmonary/Chest: Effort normal and breath sounds normal. No respiratory  "distress.   Abdominal: Soft. There is tenderness in the suprapubic area. There is no rebound and no guarding.   Musculoskeletal: Normal range of motion. He exhibits no edema.   Neurological: He is alert and oriented to person, place, and time. He has normal sensation and normal strength.   Skin: Skin is warm and dry.   Psychiatric: Mood and affect normal.   Nursing note and vitals reviewed.      LAB RESULTS  No results found for this or any previous visit (from the past 24 hour(s)).    I ordered the above labs and reviewed the results    PROGRESS AND CONSULTS    Progress Notes:  2117 Ordered bladder scan and UA for further evaluation and rodriguez catheter to be placed to resolve retention.    2119 Per RN, pt bladder scan showed 670 mL.    2230 Per RN, pt catheter has been irrigated and is now flowing more clear.    2252 Rechecked with pt, who is feeling much better after having catheter placed, but is still passing fluid that is light pink in color. Plan to admit and consult with urology. Pt understands and agrees with the plan, all questions answered.    2254 Placed call to urology for admission.    2257 Reviewed pt's history and workup with Dr. Hartman.  After a bedside evaluation; Dr. Hartman agrees with the plan of care.    2334 Ordered percocet for pain.    0210 Urology has been unable to be reached. I have placed multiple calls to their on call system and personal cell phones with no return of call. Placed call to [facility] for admission.    0234 Discussed pt with DEEPIKA Medrano, on call for Dr. James, [facility], who agrees to admit.    0320 Dr. Temple has returned my call. I informed him on pt ED course today and that pt will be admitted.     Disposition vitals:  /65 (BP Location: Right arm, Patient Position: Sitting)   Pulse 79   Temp 98.3 °F (36.8 °C) (Oral)   Resp 18   Ht 177.8 cm (70\")   Wt 92.2 kg (203 lb 4.8 oz)   SpO2 95%   BMI 29.17 kg/m²       DIAGNOSIS  Final diagnoses:   Urinary retention "   Hematuria, unspecified type   Acute UTI       DISPOSITION  ADMISSION TO MED SURG    Discussed treatment plan and reason for admission with pt/family and admitting physician.  Pt/family voiced understanding of the plan for admission for further testing/treatment as needed.     Documentation assistance provided by yecenia Larios III, PA for Coleman Larios PA-C.  Information recorded by the scribe was done at my direction and has been verified and validated by me.     Charisse Zapata  03/07/19 0323       Pieter Larios III, PA  03/08/19 0538

## 2019-03-07 NOTE — H&P
Patient Name:  Caio Ortega  YOB: 1960  MRN:  [id number]  Admit Date:  3/6/2019  Patient Care Team:  Bebo Brower MD as PCP - General  Bebo Brower MD as PCP - Family Medicine      Chief Complaint   Patient presents with   • Urinary Retention     PT HAD KIDNEY STONE REMONAL WITH STENT PLACEMENT TODAY AND HASN'T BEEN ABLE TO URINET IN 5 HOURS       Subjective     Mr. Ortega is a 59 y.o. male with a history of HTN, BPH that presents to [facility] complaining of acute urinary retention after having lithotripsy and stent placement today. He also reports chills and nausea, but these symptoms have since resolved. Patient states he was out of surgery by 3 pm today and hadn't urinated since that time. He came to ED due to suprapubic pain and urinary retention and was found upon bladder scan to have around 700 ml of urine retained. 3-way rodriguez was placed with continuous irrigation and urology was consulted. ED called our service to admit after multiple attempts to call urology without response. Upon exam, patient reports much relief with rodriguez in place. Patient denies fever, chest pain, shortness of breath, changes in bowel habits, edema. Patient was also found to have UTI on UA that was initially done in ED and was given Rocephin.     History of Present Illness    Past Medical History:   Diagnosis Date   • Allergic    • BPH (benign prostatic hypertrophy)    • Difficulty in urination     SOMETIMES AT NIGHT   • HL (hearing loss)    • Hyperlipidemia    • Hypertension    • Inguinal hernia    • Visual impairment      Past Surgical History:   Procedure Laterality Date   • HERNIA REPAIR     • INGUINAL HERNIA REPAIR Left 11/18/2016    Procedure: INGUINAL HERNIA REPAIR LAPAROSCOPIC;  Surgeon: Keron Pérez MD;  Location: [facility] [place] [facility] [place];  Service:    • INGUINAL HERNIA REPAIR       Family History   Problem Relation Age of Onset   • Cancer Mother    • Hypertension Mother    • Cancer Father     • Hypertension Father      Social History     Tobacco Use   • Smoking status: Never Smoker   • Smokeless tobacco: Never Used   Substance Use Topics   • Alcohol use: Yes     Types: 3 - 4 Cans of beer, 1 Standard drinks or equivalent per week     Comment: 1 drink per day   • Drug use: No       (Not in a hospital admission)  Allergies:    Allergies   Allergen Reactions   • Codeine Nausea And Vomiting   • Oxycodone Nausea And Vomiting       Review of Systems   Constitutional: Positive for chills. Negative for activity change and fever.   HENT: Negative.  Negative for congestion and sore throat.    Eyes: Negative.  Negative for visual disturbance.   Respiratory: Negative.  Negative for cough and shortness of breath.    Gastrointestinal: Positive for abdominal pain (suprapubic) and nausea. Negative for diarrhea and vomiting.   Endocrine: Negative.    Genitourinary: Positive for difficulty urinating and hematuria. Negative for flank pain.   Musculoskeletal: Negative.  Negative for arthralgias and myalgias.   Skin: Negative.  Negative for color change, pallor, rash and wound.   Allergic/Immunologic: Negative.    Neurological: Negative.  Negative for dizziness, weakness and headaches.   Hematological: Negative.    Psychiatric/Behavioral: Negative.  Negative for agitation, behavioral problems, confusion and decreased concentration.        Objective    Vital Signs  Temp:  [97.3 °F (36.3 °C)] 97.3 °F (36.3 °C)  Heart Rate:  [] 81  Resp:  [18-20] 18  BP: (108-146)/(63-78) 108/65  SpO2:  [98 %] 98 %  on   ;   Device (Oxygen Therapy): room air  Body mass index is 27.99 kg/m².    Physical Exam   Constitutional: He appears well-developed and well-nourished. No distress.   HENT:   Head: Normocephalic and atraumatic.   Eyes: EOM are normal. Pupils are equal, round, and reactive to light.   Neck: Normal range of motion. Neck supple.   Cardiovascular: Normal rate, regular rhythm and intact distal pulses.   No murmur  heard.  Pulmonary/Chest: Effort normal and breath sounds normal. No respiratory distress.   Abdominal: Soft. Bowel sounds are normal. He exhibits no distension. There is tenderness in the suprapubic area. There is no rigidity and no guarding.   Genitourinary:   Genitourinary Comments: 3 way rodriguez in place with continuous bladder irrigation, output dark yellow with sediment   Musculoskeletal: Normal range of motion. He exhibits no edema or tenderness.   Neurological: He is alert.   Skin: Skin is warm and dry. He is not diaphoretic.   Psychiatric: He has a normal mood and affect. His behavior is normal. Judgment and thought content normal.   Nursing note and vitals reviewed.      Results Review:   I reviewed the patient's new clinical results including all labs and xrays.    Lab Results (last 24 hours)     Procedure Component Value Units Date/Time    Urinalysis With Microscopic If Indicated (No Culture) - Urine, Catheter [[id number]]  (Abnormal) Collected:  03/06/19 2158    Specimen:  Urine, Catheter Updated:  03/06/19 2236     Color, UA Red     Comment: Any Substance that causes an abnormal urine color can alter the accuracy of the chemical reactions.        Appearance, UA Turbid     pH, UA 7.0     Specific Gravity, UA 1.015     Glucose,  mg/dL (Trace)     Ketones, UA 15 mg/dL (1+)     Bilirubin, UA Negative     Blood, UA Large (3+)     Protein, UA >=300 mg/dL (3+)     Leuk Esterase, UA Moderate (2+)     Nitrite, UA Positive     Urobilinogen, UA 4.0 E.U./dL    Narrative:       Due to the grossly bloody appearance of the specimen, the specimen was centrifuged prior to the dipstick analysis.    Urinalysis, Microscopic Only - Urine, Catheter [[id number]]  (Abnormal) Collected:  03/06/19 2158    Specimen:  Urine, Catheter Updated:  03/06/19 2239     RBC, UA Too Numerous to Count /HPF      WBC, UA       Unable to determine due to loaded field     /HPF     Bacteria, UA       Unable to determine due to loaded field      /HPF     Squamous Epithelial Cells, UA       Unable to determine due to loaded field     /HPF     Hyaline Casts, UA       Unable to determine due to loaded field     /LPF     Methodology Manual Light Microscopy    Basic Metabolic Panel [[id number]] Collected:  03/07/19 0302    Specimen:  Blood Updated:  03/07/19 0302    CBC (No Diff) [[id number]]  (Abnormal) Collected:  03/07/19 0302    Specimen:  Blood Updated:  03/07/19 0309     WBC 11.03 10*3/mm3      RBC 5.02 10*6/mm3      Hemoglobin 15.0 g/dL      Hematocrit 44.8 %      MCV 89.2 fL      MCH 29.9 pg      MCHC 33.5 g/dL      RDW 12.3 %      RDW-SD 40.4 fl      MPV 10.1 fL      Platelets 254 10*3/mm3           No orders to display     Assessment/Plan      Active Hospital Problems    Diagnosis  POA   • Urinary retention [R33.9]  Yes   • Benign essential hypertension [I10]  Yes   • Hyperlipidemia [E78.5]  Yes      Resolved Hospital Problems   No resolved problems to display.     Urinary retention  -acute retention s/p lithotripsy and stent placement today by Dr. Iraheta  -3 way rodriguez placed with continuous bladder irrigation to continue until seen by urology in AM  -urology consult  -antiemetics PRN  -UA done in ED tonight shows UTI, urine culture sent and rocephin given in ED, will let day shift rounding MD or urology address further    Gross hematuria +/- UTI.     HTN  -on HCTZ at home but will hold at this time given low BPs and potential for additional procedures    VTE Ppx  -SCDs    CODE status  -full    I discussed the patients findings and my recommendations with patient, family and nursing staff.    DEEPIKA Ovalle  03/07/19  3:24 AM    Addendum: I have reviewed the history and plan as obtained by DEEPIKA Medrano. I have personally examined the patient. My exam confirms her physical findings and I agree with the plan as listed above, with the addition to the following:    As above.  Removed scopolamine patch from pt.  Resume alpha-1 blocker.  Urine  in rodriguez clearing nicely.  If pt does well w voiding trial OK to d/c on PO antibiotics.     Sukhdev Diallo MD  09:24      [facility]  03/07/19  3:24 AM

## 2019-03-07 NOTE — PROGRESS NOTES
Discharge Planning Assessment  [facility]     Patient Name: Caio Ortega  MRN: [id number]  Today's Date: 3/7/2019    Admit Date: 3/6/2019    Discharge Needs Assessment     Row Name 03/07/19 3232       Living Environment    Family Caregiver if Needed  spouse    Quality of Family Relationships  supportive;helpful    Able to Return to Prior Arrangements  yes       Resource/Environmental Concerns    Transportation Concerns  car, none       Transition Planning    Patient/Family Anticipates Transition to  home    Patient/Family Anticipated Services at Transition  none    Transportation Anticipated  family or friend will provide       Discharge Needs Assessment    Concerns to be Addressed  no discharge needs identified    Equipment Currently Used at Home  none    Equipment Needed After Discharge  none    Offered/Gave Vendor List  yes    Row Name 03/07/19 1350       Living Environment    Lives With  spouse    Current Living Arrangements  home/apartment/condo    Primary Care Provided by  self    Family Caregiver if Needed  spouse    Family Caregiver Names  spouse Cecily Ortega [phone]        Discharge Plan     Row Name 03/07/19 1400       Plan    Plan  Home with family no needs    Plan Comments  Spoke with patient at  bedside, face sheet verified. Patient is independent with ADL's has good family support. Patient denies any home health history. Patient denies the need for Home Health at this time. Patient plans to return home at discharge with family. Ariela Medina RN        Destination      No service coordination in this encounter.      Durable Medical Equipment      No service coordination in this encounter.      Dialysis/Infusion      No service coordination in this encounter.      Home Medical Care      No service coordination in this encounter.      Community Resources      No service coordination in this encounter.          Demographic Summary     Row Name 03/07/19 7893       General Information     Admission Type  observation    Arrived From  emergency department    Referral Source  admission list    Reason for Consult  discharge planning    Preferred Language  English        Functional Status     Row Name 03/07/19 1352       Functional Status    Usual Activity Tolerance  good    Current Activity Tolerance  good       Functional Status, IADL    Medications  independent    Meal Preparation  independent    Housekeeping  independent    Laundry  independent    Shopping  independent       Mental Status    General Appearance WDL  WDL        Psychosocial    No documentation.       Abuse/Neglect    No documentation.       Legal    No documentation.       Substance Abuse    No documentation.       Patient Forms    No documentation.           Arieal Medina RN

## 2019-03-07 NOTE — ED NOTES
Emptied 400mL of of dark red urine from drainage back. Continuous bladder irrigation then initiated with normal saline. Some resistance met, flowing slower than expected. Pt does not report any discomfort at this time. SHARON Larios notified.      Pippa Cunningham RN  03/07/19 0027

## 2019-03-07 NOTE — DISCHARGE SUMMARY
PHYSICIAN DISCHARGE SUMMARY                                                                        [facility]    Patient Identification:  Name: Caio Ortega  Age: 59 y.o.  Sex: male  :  1960  MRN: [id number]  Primary Care Physician: Bebo Brower MD    Admit date: 3/6/2019  Discharge date and time: 3/7/2019     Discharged Condition: good    Discharge Diagnoses:      Urinary retention    Gross hematuria    Acute UTI (urinary tract infection)    Benign essential hypertension    Hyperlipidemia    Hospital Course:  Pleasant 59-year-old gentleman who presents to the emergency room due to acute urinary retention after lithotripsy and stent placement.  Please see H&P for full details.  On initial placement of the Velarde catheter there is gross hematuria.  Urinalysis was also consistent with UTI.  The patient was admitted and his urologist evaluated him in the morning.  He was given a dose and of the Rocephin.  The gross hematuria cleared very rapidly on its own.  A voiding trial was attempted today but he did fail and the catheter had to be replaced.  He remained afebrile vital signs stable.  He is cleared for discharge at this point with the Velarde catheter and will be followed up by urology in the office.    Consults:     Consults     Date and Time Order Name Status Description    3/7/2019 0243 Inpatient Urology Consult Completed     3/7/2019 0210 [facility] (on-call MD unless specified) Completed     3/6/2019 7460 Urology (on-call MD unless specified) Completed             Discharge Exam:  Physical Exam   Afebrile vital signs stable.  Well-developed well-nourished male in no apparent distress.  Lungs clear to auscultation.  Heart regular rate and rhythm.  Extremities with no clubbing cyanosis or edema.  Alert and oriented conversant cooperative pleasant.     Disposition:  Home    Patient Instructions:      Discharge Medications      New  Medications      Instructions Start Date   cefdinir 300 MG capsule  Commonly known as:  OMNICEF   300 mg, Oral, 2 Times Daily      finasteride 5 MG tablet  Commonly known as:  PROSCAR   5 mg, Oral, Daily         Changes to Medications      Instructions Start Date   hydrochlorothiazide 12.5 MG tablet  Commonly known as:  HYDRODIURIL  What changed:  Another medication with the same name was removed. Continue taking this medication, and follow the directions you see here.   TAKE ONE TABLET BY MOUTH DAILY         Continue These Medications      Instructions Start Date   ASPIR-81 PO   81 mg, Oral, Daily      MARIELA ROOT PO   1 tablet, Oral, Daily      MULTIPLE VITAMIN PO   Oral      OMEGA-3 FATTY ACIDS PO   1 tablet, Oral, Daily      ondansetron 8 MG tablet  Commonly known as:  ZOFRAN   8 mg, Oral, Every 8 Hours PRN      Papaya Enzyme tablet   1 tablet, Oral, Daily      PROBIOTIC PRODUCT PO   1 tablet, Oral, Daily      SAW PALMETTO (SERENOA REPENS) PO   1 tablet, Oral, Daily      UROXATRAL 10 MG 24 hr tablet  Generic drug:  alfuzosin   10 mg, Oral, Daily           Diet Instructions     Diet: Cardiac      Discharge Diet:  Cardiac        Future Appointments   Date Time Provider Department Center   5/30/2019  7:00 AM Bebo Brower MD [facility] None     Additional Instructions for the Follow-ups that You Need to Schedule     Discharge Follow-up with PCP   As directed       Currently Documented PCP:    Bebo Brower MD    PCP Phone Number:    [phone]     Follow Up Details:  1 week         Discharge Follow-up with Specialty: Urology   As directed      Specialty:  Urology    Follow Up Details:  As directed.           Follow-up Information     Bebo Brower MD .    Specialty:  Internal Medicine  Why:  1 week  Contact information:  [address] [place] [address]  [phone]                 Discharge Order (From admission, onward)    Start     Ordered    03/07/19 1605  Discharge patient  Once      Expected Discharge Date:  03/07/19    Discharge Disposition:  Home or Self Care    Physician of Record for Attribution - Please select from Treatment Team:  SUKHDEV DIALLO [[id number]]    Review needed by CMO to determine Physician of Record:  No       Question Answer Comment   Physician of Record for Attribution - Please select from Treatment Team SUKHDEV DIALLO    Review needed by CMO to determine Physician of Record No        03/07/19 1607            Total time spent discharging patient including evaluation,post hospitalization follow up,  medication and post hospitalization instructions and education total time exceeds 30 minutes.    Signed:  Sukhdev Diallo MD  3/7/2019  4:08 PM    EMR Dragon/Transcription disclaimer:   Much of this encounter note is an electronic transcription/translation of spoken language to printed text. The electronic translation of spoken language may permit erroneous, or at times, nonsensical words or phrases to be inadvertently transcribed; Although I have reviewed the note for such errors, some may still exist.

## 2019-03-07 NOTE — ED NOTES
Second 3000mL bag normal saline instilled into bladder during CBI. 3040mL dark yellow-orange fluid returned. Drainage bag emptied. Third 3000mL bag NS started. SHARON Larios notified.      Pippa Cunningham RN  03/07/19 0045

## 2019-03-07 NOTE — ED NOTES
Nursing report ED to floor  Caio Ortega  59 y.o.  male    HPI (triage note):   Chief Complaint   Patient presents with   • Urinary Retention     PT HAD KIDNEY STONE REMONAL WITH STENT PLACEMENT TODAY AND HASN'T BEEN ABLE TO URINET IN 5 HOURS       Admitting doctor:   Devin James MD    Admitting diagnosis:   The primary encounter diagnosis was Urinary retention. Diagnoses of Hematuria, unspecified type and Acute UTI were also pertinent to this visit.    Code status:   Current Code Status     Date Active Code Status Order ID Comments User Context       3/7/2019 02:43 CPR [id number]  English, Kiara C, APRN ED       Questions for Current Code Status     Question Answer Comment    Code Status CPR     Medical Interventions (Level of Support Prior to Arrest) Full           Allergies:   Codeine and Oxycodone    Weight:       03/06/19 2123   Weight: 88.5 kg (195 lb 1.7 oz)       Most recent vitals:   Vitals:    03/06/19 2123 03/06/19 2355 03/07/19 0157 03/07/19 0227   BP: 146/78 126/74 112/63 108/65   BP Location: Right arm Right arm Right arm    Patient Position: Lying Lying Lying    Pulse: 103 86  81   Resp: 20 18 18 18   Temp:       TempSrc:       SpO2: 98% 98% 98% 98%   Weight: 88.5 kg (195 lb 1.7 oz)      Height:           Active LDAs/IV Access:   Lines, Drains & Airways    Active LDAs     Name:   Placement date:   Placement time:   Site:   Days:    Peripheral IV 03/07/19 0257 Right Hand   03/07/19    0257    Hand   less than 1    Urethral Catheter Silicone;Other (Comment) 16 Fr.   03/06/19    2148     less than 1                Labs (abnormal labs have a star):   Labs Reviewed   URINALYSIS W/ MICROSCOPIC IF INDICATED (NO CULTURE) - Abnormal; Notable for the following components:       Result Value    Color, UA Red (*)     Appearance, UA Turbid (*)     Glucose,  mg/dL (Trace) (*)     Ketones, UA 15 mg/dL (1+) (*)     Blood, UA Large (3+) (*)     Protein, UA >=300 mg/dL (3+) (*)     Leuk Esterase, UA  Moderate (2+) (*)     Nitrite, UA Positive (*)     Urobilinogen, UA 4.0 E.U./dL (*)     All other components within normal limits    Narrative:     Due to the grossly bloody appearance of the specimen, the specimen was centrifuged prior to the dipstick analysis.   URINALYSIS, MICROSCOPIC ONLY - Abnormal; Notable for the following components:    RBC, UA Too Numerous to Count (*)     WBC, UA Unable to determine due to loaded field (*)     Bacteria, UA Unable to determine due to loaded field (*)     Squamous Epithelial Cells, UA Unable to determine due to loaded field (*)     All other components within normal limits   CBC (NO DIFF) - Abnormal; Notable for the following components:    WBC 11.03 (*)     All other components within normal limits   URINE CULTURE   BASIC METABOLIC PANEL       EKG:   No orders to display       Meds given in ED:   Medications   sodium chloride 0.9 % flush 3 mL (not administered)   sodium chloride 0.9 % flush 1-10 mL (not administered)   acetaminophen (TYLENOL) tablet 650 mg (not administered)   ondansetron (ZOFRAN) tablet 4 mg (not administered)     Or   ondansetron ODT (ZOFRAN-ODT) disintegrating tablet 4 mg (not administered)     Or   ondansetron (ZOFRAN) injection 4 mg (not administered)   cefTRIAXone (ROCEPHIN) IVPB 1 g (1 g Intravenous New Bag 3/7/19 0308)   oxyCODONE-acetaminophen (PERCOCET) 5-325 MG per tablet 2 tablet (2 tablets Oral Given 3/6/19 2050)       Imaging results:  No radiology results for the last day    Ambulatory status:   Independent     Social issues:   Social History     Socioeconomic History   • Marital status:      Spouse name: Not on file   • Number of children: Not on file   • Years of education: Not on file   • Highest education level: Not on file   Social Needs   • Financial resource strain: Not on file   • Food insecurity - worry: Not on file   • Food insecurity - inability: Not on file   • Transportation needs - medical: Not on file   • Transportation  needs - non-medical: Not on file   Occupational History   • Not on file   Tobacco Use   • Smoking status: Never Smoker   • Smokeless tobacco: Never Used   Substance and Sexual Activity   • Alcohol use: Yes     Types: 3 - 4 Cans of beer, 1 Standard drinks or equivalent per week     Comment: 1 drink per day   • Drug use: No   • Sexual activity: Yes     Partners: Female   Other Topics Concern   • Not on file   Social History Narrative   • Not on file        Pippa Cunningham RN  03/07/19 0322

## 2019-03-08 LAB — BACTERIA SPEC AEROBE CULT: NO GROWTH

## 2019-03-08 NOTE — PROGRESS NOTES
Case Management Discharge Note    Final Note: Discharged to home on 3/7/19 @ 18:20. RAHEEM Peace RN, CCP    Destination      No service has been selected for the patient.      Durable Medical Equipment      No service has been selected for the patient.      Dialysis/Infusion      No service has been selected for the patient.      Home Medical Care      No service has been selected for the patient.      Community Resources      No service has been selected for the patient.             Final Discharge Disposition Code: 01 - home or self-care

## 2019-03-21 ENCOUNTER — OFFICE VISIT (OUTPATIENT)
Dept: INTERNAL MEDICINE | Age: 59
End: 2019-03-21

## 2019-03-21 VITALS
WEIGHT: 193 LBS | BODY MASS INDEX: 27.63 KG/M2 | HEART RATE: 86 BPM | OXYGEN SATURATION: 99 % | HEIGHT: 70 IN | DIASTOLIC BLOOD PRESSURE: 94 MMHG | TEMPERATURE: 97.7 F | SYSTOLIC BLOOD PRESSURE: 134 MMHG

## 2019-03-21 DIAGNOSIS — H93.12 TINNITUS OF LEFT EAR: ICD-10-CM

## 2019-03-21 DIAGNOSIS — R68.89 COMPLAINING OF HEAD SYMPTOM: ICD-10-CM

## 2019-03-21 DIAGNOSIS — H53.9 VISION CHANGES: ICD-10-CM

## 2019-03-21 DIAGNOSIS — I10 BENIGN ESSENTIAL HYPERTENSION: Primary | ICD-10-CM

## 2019-03-21 PROCEDURE — 99214 OFFICE O/P EST MOD 30 MIN: CPT | Performed by: NURSE PRACTITIONER

## 2019-03-21 RX ORDER — LISINOPRIL 10 MG/1
10 TABLET ORAL DAILY
Qty: 30 TABLET | Refills: 5 | Status: SHIPPED | OUTPATIENT
Start: 2019-03-21 | End: 2019-11-22 | Stop reason: SDUPTHER

## 2019-03-21 NOTE — PATIENT INSTRUCTIONS
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
## 2019-05-22 ENCOUNTER — HOSPITAL ENCOUNTER (OUTPATIENT)
Dept: OTHER | Facility: HOSPITAL | Age: 59
Setting detail: SPECIMEN
Discharge: HOME | End: 2019-05-22
Attending: UROLOGY | Admitting: UROLOGY

## 2019-05-30 ENCOUNTER — OFFICE VISIT (OUTPATIENT)
Dept: INTERNAL MEDICINE | Age: 59
End: 2019-05-30

## 2019-05-30 VITALS
HEIGHT: 70 IN | BODY MASS INDEX: 26.34 KG/M2 | OXYGEN SATURATION: 100 % | HEART RATE: 78 BPM | DIASTOLIC BLOOD PRESSURE: 80 MMHG | SYSTOLIC BLOOD PRESSURE: 124 MMHG | TEMPERATURE: 97.2 F | RESPIRATION RATE: 13 BRPM | WEIGHT: 184 LBS

## 2019-05-30 DIAGNOSIS — E78.2 MIXED HYPERLIPIDEMIA: ICD-10-CM

## 2019-05-30 DIAGNOSIS — I10 BENIGN ESSENTIAL HYPERTENSION: Primary | ICD-10-CM

## 2019-05-30 LAB
ALBUMIN SERPL-MCNC: 4.3 G/DL (ref 3.5–5.2)
ALBUMIN/GLOB SERPL: 1.7 G/DL
ALP SERPL-CCNC: 78 U/L (ref 39–117)
ALT SERPL-CCNC: 18 U/L (ref 1–41)
AST SERPL-CCNC: 18 U/L (ref 1–40)
BILIRUB SERPL-MCNC: 0.7 MG/DL (ref 0.2–1.2)
BUN SERPL-MCNC: 21 MG/DL (ref 8–23)
BUN/CREAT SERPL: 22.1 (ref 7–25)
CALCIUM SERPL-MCNC: 10.4 MG/DL (ref 8.6–10.5)
CHLORIDE SERPL-SCNC: 103 MMOL/L (ref 98–107)
CHOLEST SERPL-MCNC: 171 MG/DL (ref 0–200)
CO2 SERPL-SCNC: 27.8 MMOL/L (ref 22–29)
CREAT SERPL-MCNC: 0.95 MG/DL (ref 0.76–1.27)
GLOBULIN SER CALC-MCNC: 2.6 GM/DL
GLUCOSE SERPL-MCNC: 85 MG/DL (ref 65–99)
HDLC SERPL-MCNC: 46 MG/DL (ref 40–60)
LDLC SERPL CALC-MCNC: 97 MG/DL (ref 0–100)
POTASSIUM SERPL-SCNC: 5 MMOL/L (ref 3.5–5.2)
PROT SERPL-MCNC: 6.9 G/DL (ref 6–8.5)
SODIUM SERPL-SCNC: 142 MMOL/L (ref 136–145)
TRIGL SERPL-MCNC: 141 MG/DL (ref 0–150)
VLDLC SERPL CALC-MCNC: 28.2 MG/DL

## 2019-05-30 PROCEDURE — 99214 OFFICE O/P EST MOD 30 MIN: CPT | Performed by: INTERNAL MEDICINE

## 2019-05-30 NOTE — PROGRESS NOTES
Grayson Ortega is a 59 y.o. male who presents with   Chief Complaint   Patient presents with   • Hypertension     Currently taking lisinopril 10 mg daily   • Hyperlipidemia     Currently on no lipid-lowering therapy by prescription   • Status post genitourinary surgery/kidney stone     Right ureteral calculus/urinary retention/prostate   .    59-year-old male who presents for his 6-month checkup/lab update visit.  Since I saw him last he had a right ureteral kidney stone February 28 that necessitated lithotripsy and stent placement by urology.  After that he went into the hospital on March 19 with acute urinary retention and then subsequently had prostate surgery by .  I do not have any record of the type of surgery that was performed however.      Hypertension   This is a chronic problem. The current episode started more than 1 year ago. The problem is controlled. Past treatments include diuretics. Current antihypertension treatment includes ACE inhibitors. The current treatment provides moderate improvement. There are no compliance problems.    Hyperlipidemia   This is a chronic problem. The problem is controlled. Recent lipid tests were reviewed and are normal. He is currently on no antihyperlipidemic treatment.        The following portions of the patient's history were reviewed and updated as appropriate: allergies, current medications, past medical history and problem list.    Review of Systems   Constitutional: Negative.    HENT: Negative.    Eyes: Negative.    Respiratory: Negative.    Cardiovascular: Negative.    Genitourinary: Negative.    Musculoskeletal: Negative.    Skin: Negative.    Neurological: Negative.    Psychiatric/Behavioral: Negative.        Objective   Physical Exam   Constitutional: He is oriented to person, place, and time. He appears well-developed and well-nourished. No distress.   HENT:   Head: Normocephalic and atraumatic.   Eyes: Conjunctivae and EOM are normal. Pupils are  equal, round, and reactive to light.   Neck: Normal range of motion. Neck supple. No thyromegaly present.   Neck exam negative.  Carotid auscultation normal-no bruits heard.   Cardiovascular: Normal rate, regular rhythm, normal heart sounds and intact distal pulses. Exam reveals no gallop and no friction rub.   No murmur heard.  Pulmonary/Chest: Effort normal and breath sounds normal. No respiratory distress. He has no wheezes. He has no rales. He exhibits no tenderness.   Neurological: He is alert and oriented to person, place, and time.   Psychiatric: He has a normal mood and affect. His behavior is normal. Judgment and thought content normal.   Nursing note and vitals reviewed.      Assessment/Plan   Ray was seen today for hypertension, hyperlipidemia and status post genitourinary surgery/kidney stone.    Diagnoses and all orders for this visit:    Benign essential hypertension  -     Comprehensive Metabolic Panel    Mixed hyperlipidemia  -     Comprehensive Metabolic Panel  -     Lipid Panel      Plan: Labs as above for the issues as outlined.  Tentatively we will plan on a 6-month checkup/lab update visit.    Continue urology care with Dr. Mcnamara.

## 2019-11-22 DIAGNOSIS — I10 BENIGN ESSENTIAL HYPERTENSION: ICD-10-CM

## 2019-11-22 RX ORDER — LISINOPRIL 10 MG/1
TABLET ORAL
Qty: 30 TABLET | Refills: 11 | Status: SHIPPED | OUTPATIENT
Start: 2019-11-22 | End: 2019-12-27 | Stop reason: SDUPTHER

## 2019-12-27 ENCOUNTER — OFFICE VISIT (OUTPATIENT)
Dept: INTERNAL MEDICINE | Age: 59
End: 2019-12-27

## 2019-12-27 VITALS
BODY MASS INDEX: 26.92 KG/M2 | WEIGHT: 188 LBS | HEART RATE: 74 BPM | SYSTOLIC BLOOD PRESSURE: 130 MMHG | RESPIRATION RATE: 13 BRPM | OXYGEN SATURATION: 98 % | TEMPERATURE: 98.4 F | HEIGHT: 70 IN | DIASTOLIC BLOOD PRESSURE: 80 MMHG

## 2019-12-27 DIAGNOSIS — E78.2 MIXED HYPERLIPIDEMIA: ICD-10-CM

## 2019-12-27 DIAGNOSIS — I10 BENIGN ESSENTIAL HYPERTENSION: Primary | ICD-10-CM

## 2019-12-27 LAB
ALBUMIN SERPL-MCNC: 4.5 G/DL (ref 3.5–5.2)
ALBUMIN/GLOB SERPL: 1.8 G/DL
ALP SERPL-CCNC: 76 U/L (ref 39–117)
ALT SERPL-CCNC: 20 U/L (ref 1–41)
AST SERPL-CCNC: 20 U/L (ref 1–40)
BILIRUB SERPL-MCNC: 0.5 MG/DL (ref 0.2–1.2)
BUN SERPL-MCNC: 21 MG/DL (ref 8–23)
BUN/CREAT SERPL: 21.4 (ref 7–25)
CALCIUM SERPL-MCNC: 9.6 MG/DL (ref 8.6–10.5)
CHLORIDE SERPL-SCNC: 105 MMOL/L (ref 98–107)
CHOLEST SERPL-MCNC: 202 MG/DL (ref 0–200)
CO2 SERPL-SCNC: 26.7 MMOL/L (ref 22–29)
CREAT SERPL-MCNC: 0.98 MG/DL (ref 0.76–1.27)
GLOBULIN SER CALC-MCNC: 2.5 GM/DL
GLUCOSE SERPL-MCNC: 80 MG/DL (ref 65–99)
HDLC SERPL-MCNC: 50 MG/DL (ref 40–60)
LDLC SERPL CALC-MCNC: 128 MG/DL (ref 0–100)
POTASSIUM SERPL-SCNC: 4.9 MMOL/L (ref 3.5–5.2)
PROT SERPL-MCNC: 7 G/DL (ref 6–8.5)
SODIUM SERPL-SCNC: 144 MMOL/L (ref 136–145)
TRIGL SERPL-MCNC: 119 MG/DL (ref 0–150)
VLDLC SERPL CALC-MCNC: 23.8 MG/DL

## 2019-12-27 PROCEDURE — 99214 OFFICE O/P EST MOD 30 MIN: CPT | Performed by: INTERNAL MEDICINE

## 2019-12-27 RX ORDER — LISINOPRIL 10 MG/1
10 TABLET ORAL DAILY
Qty: 30 TABLET | Refills: 11 | Status: SHIPPED | OUTPATIENT
Start: 2019-12-27 | End: 2021-02-15 | Stop reason: SDUPTHER

## 2019-12-27 NOTE — PROGRESS NOTES
"  Grayson Ortega is a 59 y.o. male who presents with   Chief Complaint   Patient presents with   • Hypertension     Lisinopril 10 mg daily; blood pressures at home \"averaging 127/80\"   • Hyperlipidemia     No prescription medication   .    59-year-old male.  6-month checkup/lab update visit.  No complaints    Hypertension   This is a chronic problem. The current episode started more than 1 year ago. The problem is unchanged. The problem is controlled. Associated symptoms include chest pain. Current antihypertension treatment includes ACE inhibitors. The current treatment provides moderate improvement. There are no compliance problems.    Hyperlipidemia   This is a chronic problem. The current episode started more than 1 year ago. The problem is controlled. Recent lipid tests were reviewed and are normal. Associated symptoms include chest pain. He is currently on no antihyperlipidemic treatment.        /80   Pulse 74   Temp 98.4 °F (36.9 °C)   Resp 13   Ht 177.8 cm (70\")   Wt 85.3 kg (188 lb)   SpO2 98%   BMI 26.98 kg/m²     The following portions of the patient's history were reviewed and updated as appropriate: allergies, current medications, past medical history and problem list.    Review of Systems   Constitutional: Negative.    HENT: Negative.    Eyes: Negative.    Respiratory: Negative.    Cardiovascular: Positive for chest pain.        Patient with a recent episode of anterior sharp chest pain.  He denies nausea, vomiting, diaphoresis or any relation to food or exercise.  Problem discussed.  He stated \"I do not want any testing or referral but I just wanted to mention it\".   Genitourinary: Negative.    Musculoskeletal: Negative.    Skin: Negative.    Neurological: Negative.    Psychiatric/Behavioral: Negative.        Objective   Physical Exam   Constitutional: He is oriented to person, place, and time. He appears well-developed and well-nourished. No distress.   HENT:   Head: Normocephalic and " atraumatic.   Eyes: Pupils are equal, round, and reactive to light. Conjunctivae and EOM are normal.   Neck: Normal range of motion. Neck supple. No thyromegaly present.   Neck exam negative.  Carotid auscultation normal-no bruits heard.   Cardiovascular: Normal rate, regular rhythm, normal heart sounds and intact distal pulses. Exam reveals no gallop and no friction rub.   No murmur heard.  Pulmonary/Chest: Effort normal and breath sounds normal. No respiratory distress. He has no wheezes. He has no rales. He exhibits no tenderness.   Neurological: He is alert and oriented to person, place, and time.   Psychiatric: He has a normal mood and affect. His behavior is normal. Judgment and thought content normal.   Nursing note and vitals reviewed.      Assessment/Plan   Ray was seen today for hypertension and hyperlipidemia.    Diagnoses and all orders for this visit:    Benign essential hypertension  -     lisinopril (PRINIVIL,ZESTRIL) 10 MG tablet; Take 1 tablet by mouth Daily.  -     Comprehensive Metabolic Panel    Mixed hyperlipidemia  -     Comprehensive Metabolic Panel  -     Lipid Panel      Plan: Labs as above for the issues as outlined.  Continue all treatment as prescribed.  6-month checkup/lab update visit advised.  Based on symptoms of chest pain as described above he likely had an episode of chest wall pain but cardiac cannot be ruled out or ruled in since he declines to be evaluated.  He is willing to assume the risks of his decision.    Physical exam advised-declined.      Flu shot advised-declined.    The patient has not had a screening colonoscopy.  The patient refuses to be referred to gastroenterology and to get a colonoscopy scheduled for the near future.  A COLOGUARD test was also declined as was the newer blood test- Epi pro Colon Septin 9 gene blood test.  The patient is aware of the risks of undiagnosed colon cancer including GI bleed, bowel obstruction, bowel perforation, metastatic colon  cancer and death.  The patient voices an understanding of these risks but also voices a willingness to accept those risks based on the current decision to decline a colonoscopy.

## 2020-11-12 ENCOUNTER — OFFICE VISIT (OUTPATIENT)
Dept: INTERNAL MEDICINE | Age: 60
End: 2020-11-12

## 2020-11-12 VITALS
RESPIRATION RATE: 13 BRPM | WEIGHT: 187.2 LBS | OXYGEN SATURATION: 98 % | DIASTOLIC BLOOD PRESSURE: 60 MMHG | HEIGHT: 70 IN | TEMPERATURE: 96.9 F | BODY MASS INDEX: 26.8 KG/M2 | HEART RATE: 91 BPM | SYSTOLIC BLOOD PRESSURE: 110 MMHG

## 2020-11-12 DIAGNOSIS — R53.81 MALAISE AND FATIGUE: Primary | ICD-10-CM

## 2020-11-12 DIAGNOSIS — I10 BENIGN ESSENTIAL HYPERTENSION: ICD-10-CM

## 2020-11-12 DIAGNOSIS — R63.0 LOSS OF APPETITE: ICD-10-CM

## 2020-11-12 DIAGNOSIS — J39.8 CONGESTION OF UPPER RESPIRATORY TRACT: ICD-10-CM

## 2020-11-12 DIAGNOSIS — R53.83 MALAISE AND FATIGUE: Primary | ICD-10-CM

## 2020-11-12 PROCEDURE — 99214 OFFICE O/P EST MOD 30 MIN: CPT | Performed by: INTERNAL MEDICINE

## 2020-11-12 RX ORDER — CETIRIZINE HYDROCHLORIDE 10 MG/1
TABLET ORAL
COMMUNITY
Start: 2020-11-11

## 2020-11-12 NOTE — PROGRESS NOTES
"  Grayson Ortega is a 60 y.o. male who presents with   Chief Complaint   Patient presents with   • General malaise/loss of appetite/upper respiratory congestio     3 days   • Hypertension   .    60-year-old male presents to the office with a 3-day history of general fatigue and malaise and no energy.  He has had also loss of appetite and mild upper respiratory congestion but no fever, no cough, no chills, no shortness of breath, no loss of taste, no loss of sense of smell.  He says he generally does not feel bad other than for his fatigue and malaise.    Hypertension  This is a chronic problem. The current episode started more than 1 year ago. The problem has been waxing and waning since onset. The problem is controlled. Treatments tried: See medication list. The current treatment provides moderate improvement.        /60   Pulse 91   Temp 96.9 °F (36.1 °C)   Resp 13   Ht 177.8 cm (70\")   Wt 84.9 kg (187 lb 3.2 oz)   SpO2 98%   BMI 26.86 kg/m²     The following portions of the patient's history were reviewed and updated as appropriate: allergies, current medications, past medical history and problem list.    Review of Systems   Constitutional: Negative.    HENT: Negative.    Eyes: Negative.    Respiratory: Negative.    Cardiovascular: Negative.    Genitourinary: Negative.    Musculoskeletal: Negative.    Skin: Negative.    Neurological: Negative.    Psychiatric/Behavioral: Negative.        Objective   Physical Exam  Vitals signs and nursing note reviewed.   Constitutional:       General: He is not in acute distress.     Appearance: He is well-developed. He is not diaphoretic.   HENT:      Head: Normocephalic and atraumatic.      Right Ear: Tympanic membrane normal.      Left Ear: Tympanic membrane normal.      Nose: Nose normal.   Eyes:      Pupils: Pupils are equal, round, and reactive to light.   Neck:      Musculoskeletal: Normal range of motion and neck supple.      Thyroid: No thyromegaly.      " Comments: Neck exam negative.  Carotid auscultation normal-no bruits heard.    Cardiovascular:      Rate and Rhythm: Normal rate and regular rhythm.      Heart sounds: Normal heart sounds. No murmur. No friction rub. No gallop.    Pulmonary:      Effort: Pulmonary effort is normal. No respiratory distress.      Breath sounds: Normal breath sounds. No wheezing or rales.   Chest:      Chest wall: No tenderness.   Skin:     General: Skin is warm and dry.      Coloration: Skin is not pale.      Findings: No erythema.   Psychiatric:         Behavior: Behavior normal.         Thought Content: Thought content normal.         Judgment: Judgment normal.         Assessment/Plan   Diagnoses and all orders for this visit:    1. Malaise and fatigue (Primary)  -     COVID-19,[facility] ROUTINE, NP/OP SWAB IN TRANSPORT MEDIA OR ESWAB 72 HR TAT - Swab, Anterior nasal; Future    2. Loss of appetite  -     COVID-19,[facility] ROUTINE, NP/OP SWAB IN TRANSPORT MEDIA OR ESWAB 72 HR TAT - Swab, Anterior nasal; Future    3. Congestion of upper respiratory tract  -     COVID-19,[facility] ROUTINE, NP/OP SWAB IN TRANSPORT MEDIA OR ESWAB 72 HR TAT - Swab, Anterior nasal; Future    4. Benign essential hypertension      Plan: The patient was seen with face mask and face shield both on and in place.  Rubber gloves were used when examining the patient.    We will send him to the [facility] urgent care center in [place] today for COVID-19 testing.    If negative he has been advised to schedule a lab only visit for the near future for CMP, lipid panel lab updates.

## 2021-02-15 DIAGNOSIS — I10 BENIGN ESSENTIAL HYPERTENSION: ICD-10-CM

## 2021-02-15 RX ORDER — LISINOPRIL 10 MG/1
10 TABLET ORAL DAILY
Qty: 30 TABLET | Refills: 0 | Status: SHIPPED | OUTPATIENT
Start: 2021-02-15 | End: 2022-05-19

## 2021-03-22 ENCOUNTER — BULK ORDERING (OUTPATIENT)
Dept: CASE MANAGEMENT | Facility: OTHER | Age: 61
End: 2021-03-22

## 2021-03-22 DIAGNOSIS — Z23 IMMUNIZATION DUE: ICD-10-CM

## 2022-05-19 ENCOUNTER — HOSPITAL ENCOUNTER (OUTPATIENT)
Dept: GENERAL RADIOLOGY | Facility: HOSPITAL | Age: 62
Discharge: HOME | End: 2022-05-19
Admitting: NURSE PRACTITIONER

## 2022-05-19 ENCOUNTER — OFFICE VISIT (OUTPATIENT)
Dept: INTERNAL MEDICINE | Age: 62
End: 2022-05-19

## 2022-05-19 VITALS
HEART RATE: 84 BPM | HEIGHT: 70 IN | WEIGHT: 193 LBS | BODY MASS INDEX: 27.63 KG/M2 | DIASTOLIC BLOOD PRESSURE: 98 MMHG | SYSTOLIC BLOOD PRESSURE: 160 MMHG | OXYGEN SATURATION: 99 % | TEMPERATURE: 97.8 F

## 2022-05-19 DIAGNOSIS — Z00.00 ANNUAL PHYSICAL EXAM: Primary | ICD-10-CM

## 2022-05-19 DIAGNOSIS — E78.2 MIXED HYPERLIPIDEMIA: ICD-10-CM

## 2022-05-19 DIAGNOSIS — M16.10 ARTHRITIS PAIN OF HIP: ICD-10-CM

## 2022-05-19 DIAGNOSIS — I10 BENIGN ESSENTIAL HYPERTENSION: ICD-10-CM

## 2022-05-19 PROBLEM — N39.0 ACUTE UTI (URINARY TRACT INFECTION): Status: RESOLVED | Noted: 2019-03-07 | Resolved: 2022-05-19

## 2022-05-19 PROBLEM — R31.0 GROSS HEMATURIA: Status: RESOLVED | Noted: 2019-03-07 | Resolved: 2022-05-19

## 2022-05-19 PROBLEM — R33.9 URINARY RETENTION: Status: RESOLVED | Noted: 2019-03-07 | Resolved: 2022-05-19

## 2022-05-19 PROCEDURE — 99213 OFFICE O/P EST LOW 20 MIN: CPT | Performed by: NURSE PRACTITIONER

## 2022-05-19 PROCEDURE — 73502 X-RAY EXAM HIP UNI 2-3 VIEWS: CPT

## 2022-05-19 PROCEDURE — 99396 PREV VISIT EST AGE 40-64: CPT | Performed by: NURSE PRACTITIONER

## 2022-05-19 NOTE — PROGRESS NOTES
"    I N T E R N A L  M E D I C I N E  Danuta Garduno, APRN       ENCOUNTER DATE:  05/19/2022    [place] / 62 y.o. / male    CHIEF COMPLAINT     Establish care with new PCP, former patient of Dr Brower, Hypertension, Hip Pain and Leg Pain (Right ), Hyperlipidemia      VITALS     Visit Vitals  /98   Pulse 84   Temp 97.8 °F (36.6 °C) (Temporal)   Ht 177.8 cm (70\")   Wt 87.5 kg (193 lb)   SpO2 99%   BMI 27.69 kg/m²       BP Readings from Last 3 Encounters:   05/19/22 160/98   11/21/20 136/86   11/12/20 110/60     Wt Readings from Last 3 Encounters:   05/19/22 87.5 kg (193 lb)   11/21/20 83.9 kg (185 lb)   11/12/20 84.9 kg (187 lb 3.2 oz)      Body mass index is 27.69 kg/m².    Blood pressure readings recorded on patient flowsheet:  No flowsheet data found.     MEDICATIONS     Current Outpatient Medications on File Prior to Visit   Medication Sig Dispense Refill   • cetirizine (zyrTEC) 10 MG tablet      • MULTIPLE VITAMIN PO Take  by mouth.     • OMEGA-3 FATTY ACIDS PO Take 1 tablet by mouth Daily.     • PROBIOTIC PRODUCT PO Take 1 tablet by mouth Daily.     • [DISCONTINUED] benzonatate (TESSALON) 200 MG capsule Take 1 capsule by mouth 3 (Three) Times a Day As Needed for Cough. 30 capsule 0   • [DISCONTINUED] lisinopril (PRINIVIL,ZESTRIL) 10 MG tablet Take 1 tablet by mouth Daily. 30 tablet 0   • [DISCONTINUED] predniSONE (DELTASONE) 10 MG (21) dose pack Take  by mouth Daily. Use as directed on package 21 each 0     No current facility-administered medications on file prior to visit.         HISTORY OF PRESENT ILLNESS      Grayson is a 62 year old male whom presents for annual health maintenance visit.    Hypertension: The problem is chronic , uncontrolled. Patient states that he stopped taking lisinopril months ago sec to side effects. States daughter is a RN and checks it at home and typically <130/80. Instructed patient to keep a log for 2 weeks and let me know if >140/80 . Pertinent negatives include no anxiety, " blurred vision, chest pain, headaches, peripheral edema, PND, shortness of breath or sweats. Risk factors for coronary artery disease include post-menopausal state, dyslipidemia and diabetes mellitus.      Hyperlipidemia: currently not on medication. Labs ordered. Continue lifestyle modifications for high blood pressure, high cholesterol, and increased weight. Decrease/eliminate soda, caffeine, alcohol and overall caloric intake. Reduce carbohydrates and sweets in diet.  Continue to improve dietary habits with lean proteins, fresh vegetables, fruits, and nuts. Improve aerobic exercise: walking/biking/swimming daily as tolerated, recommend 30 minutes/day at least.    Left Hip Pain: states has been ongoing for past 2-3 months. Negative for trauma. Negative for numbness, tingling. Positive for hip trochanter tenderness. Negative for Caren's. States Excedrin helps relieve the discomfort. Order for X ray hip. Discussion of arthritis and treatment with heat, OTC pain gels, exercise.      · General health: good  · Lifestyle:  · Attempting to lose weight?: No   · Diet: eats moderately healthy  · Exercise: walks regularly  · Tobacco: Never used   · Alcohol: occasional/infrequent  · Work: Full-time  · Reproductive health:  · Sexually active?: Yes   · Concern for STD?: No   · Sexual problems?: No problems   · Sees Urologist?: Previously seen for TURP  · Depression Screening:      PHQ-2/PHQ-9 Depression Screening 5/19/2022   Retired Total Score -   Little Interest or Pleasure in Doing Things 0-->not at all   Feeling Down, Depressed or Hopeless 0-->not at all   PHQ-9: Brief Depression Severity Measure Score 0         PHQ-2: 0 (Not depressed)     PHQ-9: 0 (Negative screening for depression)    Patient Care Team:  Danuta Garduno APRN as PCP - General (Family Medicine)  ______________________________________________________________________    ALLERGIES  Allergies   Allergen Reactions   • Codeine Nausea And Vomiting   • Oxycodone  Nausea And Vomiting        PFSH:     The following portions of the patient's history were reviewed and updated as appropriate: Allergies / Current Medications / Past Medical History / Surgical History / Social History / Family History    PROBLEM LIST   Patient Active Problem List   Diagnosis   • Benign essential hypertension   • Hyperlipidemia   • Elevated PSA   • Arthritis pain of hip   • Annual physical exam       PAST MEDICAL HISTORY  Past Medical History:   Diagnosis Date   • Acute UTI (urinary tract infection) 3/7/2019   • Allergic    • Arthritis    • BPH (benign prostatic hypertrophy)    • Difficulty in urination     SOMETIMES AT NIGHT   • Hematuria    • HL (hearing loss)    • Hyperlipidemia    • Hypertension    • Inguinal hernia    • Visual impairment        SURGICAL HISTORY  Past Surgical History:   Procedure Laterality Date   • HERNIA REPAIR     • INGUINAL HERNIA REPAIR Left 11/18/2016    Procedure: INGUINAL HERNIA REPAIR LAPAROSCOPIC;  Surgeon: Keron Pérez MD;  [place] [facility] [place] [facility] [place]:    • INGUINAL HERNIA REPAIR     • PROSTATE SURGERY  2019       SOCIAL HISTORY  Social History     Socioeconomic History   • Marital status:    Tobacco Use   • Smoking status: Never Smoker   • Smokeless tobacco: Never Used   Substance and Sexual Activity   • Alcohol use: Yes     Types: 1 - 2 Glasses of wine, 2 - 3 Cans of beer, 1 Standard drinks or equivalent per week     Comment: 1 drink per day   • Drug use: No   • Sexual activity: Yes     Partners: Female     Birth control/protection: Post-menopausal       FAMILY HISTORY  Family History   Problem Relation Age of Onset   • Cancer Mother    • Hypertension Mother    • Cancer Father    • Hypertension Father        IMMUNIZATION HISTORY    There is no immunization history on file for this patient.      REVIEW OF SYSTEMS     Review of Systems   Constitutional: Negative.    HENT: Negative.    Eyes: Negative.    Respiratory: Negative.    Cardiovascular:  Negative.    Gastrointestinal: Negative.    Genitourinary: Negative.    Musculoskeletal: Positive for arthralgias.   Skin: Negative.    Allergic/Immunologic: Positive for environmental allergies.   Neurological: Negative.    Hematological: Negative.    Psychiatric/Behavioral: Negative.        PHYSICAL EXAMINATION     Physical Exam  Constitutional:       Appearance: He is normal weight.   HENT:      Head: Normocephalic.      Right Ear: Tympanic membrane normal.      Left Ear: Tympanic membrane normal.      Nose: Nose normal.      Mouth/Throat:      Mouth: Mucous membranes are moist.   Eyes:      Extraocular Movements: Extraocular movements intact.      Pupils: Pupils are equal, round, and reactive to light.   Cardiovascular:      Rate and Rhythm: Normal rate and regular rhythm.      Pulses: Normal pulses.      Heart sounds: Normal heart sounds.   Pulmonary:      Effort: Pulmonary effort is normal.      Breath sounds: Normal breath sounds.   Abdominal:      General: Bowel sounds are normal.      Palpations: Abdomen is soft.   Musculoskeletal:         General: Tenderness present.      Cervical back: Normal range of motion.   Skin:     General: Skin is warm and dry.      Capillary Refill: Capillary refill takes less than 2 seconds.   Neurological:      Mental Status: He is alert and oriented to person, place, and time.   Psychiatric:         Mood and Affect: Mood normal.         Behavior: Behavior normal.         Thought Content: Thought content normal.         Judgment: Judgment normal.         REVIEWED DATA      Labs:    Lab Results   Component Value Date     12/27/2019    K 4.9 12/27/2019    CALCIUM 9.6 12/27/2019    AST 20 12/27/2019    ALT 20 12/27/2019    BUN 21 12/27/2019    CREATININE 0.98 12/27/2019    CREATININE 0.95 05/30/2019    CREATININE 1.0 05/08/2019    EGFRIFNONA 78 12/27/2019    EGFRIFAFRI 94 12/27/2019       Lab Results   Component Value Date    GLUCOSE 80 12/27/2019       Lab Results    Component Value Date    PSA 4.140 (H) 11/03/2017       No results found for: TESTOSTERONE, TESTOSTEROTT, TESTFRE    Lab Results   Component Value Date     (H) 12/27/2019    HDL 50 12/27/2019    TRIG 119 12/27/2019       No components found for: [id number]    Lab Results   Component Value Date    WBC 6.44 05/08/2019    HGB 15.4 05/08/2019    MCV 89.6 05/08/2019     05/08/2019       Lab Results   Component Value Date    PROTEIN Trace (A) 10/09/2015    GLUCOSEU 100 mg/dL (Trace) (A) 03/06/2019    BLOODU Large (3+) (A) 03/06/2019    NITRITEU Positive (A) 03/06/2019    LEUKOCYTESUR Moderate (2+) (A) 03/06/2019        No results found for: HEPCVIRUSABY    Imaging:           Medical Tests:           ASSESSMENT & PLAN     ANNUAL WELLNESS EXAM / PHYSICAL     Other medical problems addressed today:  Problem List Items Addressed This Visit        Cardiac and Vasculature    Benign essential hypertension    Hyperlipidemia       Health Encounters    Annual physical exam - Primary    Relevant Orders    CBC & Differential    Comprehensive Metabolic Panel    Hemoglobin A1c    Lipid Panel With / Chol / HDL Ratio    T4, Free    TSH    Urinalysis With Culture If Indicated -       Musculoskeletal and Injuries    Arthritis pain of hip    Relevant Orders    XR Hip With or Without Pelvis 2 - 3 View Right          Summary/Discussion:     · Follow up [facility] as needed  · I spent 40 min in direct care of this patient on this date of service. This time includes times spent by me in the following activities: Preparing for the visit, obtaining and/or reviewing a separately obtained history, performing a medically appropriate examination and/or evaluation, reviewing medical records, reviewing tests, ordering medications, tests, or procedures, counseling and educating the patient, documenting information in the medical record and reviewing office note/correspondence from other providers.   ·   Return in about 6 months (around  11/19/2022) for Next scheduled follow up.      HEALTHCARE MAINTENANCE ISSUES       Cancer Screening:  · Colon: Initial/Next screening at age: DUE NOW  · Repeat colon cancer screening: every 10 years  · Prostate: No screening needed at this time  · Testicular: Recommended monthly self exam  · Skin: Monthly self skin examination, annual exam by health professional  · Lung: Does not meet criteria for lung cancer screening.   · Other:    Screening Labs & Tests:  · Lab results reviewed & discussed with with patient or orders placed today.  · EKG:  · CV Screening: Lipid panel  · DEXA (75+ or risk factors):   · HEP C (If born [id number] or risk factors): Will check next time  · Other:     Immunization/Vaccinations (to be given today unless deferred by patient)  · Influenza: Recommended annual influenza vaccine  · Hepatitis A: Verify immunization records  · Hepatitis B: Verify immunization records  · Tetanus/Pertussis: Not needed at this time  · Pneumovax/PCV: Recommended here or at pharmacy  · Shingles: Recommended Shingrix at pharmacy  · COVID: DAINA CARRILLO COVID: Completed primary vaccine series and booster  Lifestyle Counseling:  · Lifestyle Modifications: Continue good lifestyle choices/modifications, Begin progressive aerobic exercise program 3-5 days a week, Improve sleep hygiene and Reduce exposure to stress if possible  · Safety Issues: Always wear seatbelt, Avoid texting while driving   · Use sunscreen, regular skin examination  · Recommended annual dental/vision examination.  · Emotional/Stress/Sleep: Reviewed and  given when appropriate      Health Maintenance   Topic Date Due   • COVID-19 Vaccine (1) Never done   • TDAP/TD VACCINES (1 - Tdap) Never done   • ZOSTER VACCINE (1 of 2) Never done   • LIPID PANEL  12/27/2020   • ANNUAL PHYSICAL  12/28/2020   • INFLUENZA VACCINE  08/01/2022   • COLORECTAL CANCER SCREENING  12/27/2029   • HEPATITIS C SCREENING  Addressed   • Pneumococcal Vaccine 0-64  Aged Out            *Examiner was wearing mask protection during the entire duration of the visit. Patient was masked the entire time. Minimum social distance of 6 ft maintained entire visit except if physical contact was necessary as documented.       Template created by DEEPIKA Chavis

## 2022-05-20 ENCOUNTER — TELEPHONE (OUTPATIENT)
Dept: INTERNAL MEDICINE | Age: 62
End: 2022-05-20

## 2022-05-20 LAB
ALBUMIN SERPL-MCNC: 4.5 G/DL (ref 3.8–4.8)
ALBUMIN/GLOB SERPL: 2 {RATIO} (ref 1.2–2.2)
ALP SERPL-CCNC: 89 IU/L (ref 44–121)
ALT SERPL-CCNC: 21 IU/L (ref 0–44)
APPEARANCE UR: CLEAR
AST SERPL-CCNC: 22 IU/L (ref 0–40)
BACTERIA #/AREA URNS HPF: NORMAL /[HPF]
BASOPHILS # BLD AUTO: 0 X10E3/UL (ref 0–0.2)
BASOPHILS NFR BLD AUTO: 0 %
BILIRUB SERPL-MCNC: 0.4 MG/DL (ref 0–1.2)
BILIRUB UR QL STRIP: NEGATIVE
BUN SERPL-MCNC: 22 MG/DL (ref 8–27)
BUN/CREAT SERPL: 24 (ref 10–24)
CALCIUM SERPL-MCNC: 9.6 MG/DL (ref 8.6–10.2)
CASTS URNS QL MICRO: NORMAL /LPF
CHLORIDE SERPL-SCNC: 103 MMOL/L (ref 96–106)
CHOLEST SERPL-MCNC: 185 MG/DL (ref 100–199)
CHOLEST/HDLC SERPL: 4 RATIO (ref 0–5)
CO2 SERPL-SCNC: 24 MMOL/L (ref 20–29)
COLOR UR: YELLOW
CREAT SERPL-MCNC: 0.9 MG/DL (ref 0.76–1.27)
EGFRCR SERPLBLD CKD-EPI 2021: 95 ML/MIN/1.73
EOSINOPHIL # BLD AUTO: 0.2 X10E3/UL (ref 0–0.4)
EOSINOPHIL NFR BLD AUTO: 3 %
EPI CELLS #/AREA URNS HPF: NORMAL /HPF (ref 0–10)
ERYTHROCYTE [DISTWIDTH] IN BLOOD BY AUTOMATED COUNT: 12.4 % (ref 11.6–15.4)
GLOBULIN SER CALC-MCNC: 2.2 G/DL (ref 1.5–4.5)
GLUCOSE SERPL-MCNC: 84 MG/DL (ref 65–99)
GLUCOSE UR QL STRIP: NEGATIVE
HBA1C MFR BLD: 5 % (ref 4.8–5.6)
HCT VFR BLD AUTO: 47.2 % (ref 37.5–51)
HDLC SERPL-MCNC: 46 MG/DL
HGB BLD-MCNC: 16.6 G/DL (ref 13–17.7)
HGB UR QL STRIP: NEGATIVE
IMM GRANULOCYTES # BLD AUTO: 0 X10E3/UL (ref 0–0.1)
IMM GRANULOCYTES NFR BLD AUTO: 0 %
KETONES UR QL STRIP: NEGATIVE
LDLC SERPL CALC-MCNC: 115 MG/DL (ref 0–99)
LEUKOCYTE ESTERASE UR QL STRIP: NEGATIVE
LYMPHOCYTES # BLD AUTO: 1.7 X10E3/UL (ref 0.7–3.1)
LYMPHOCYTES NFR BLD AUTO: 22 %
MCH RBC QN AUTO: 31.1 PG (ref 26.6–33)
MCHC RBC AUTO-ENTMCNC: 35.2 G/DL (ref 31.5–35.7)
MCV RBC AUTO: 88 FL (ref 79–97)
MICRO URNS: NORMAL
MICRO URNS: NORMAL
MONOCYTES # BLD AUTO: 0.6 X10E3/UL (ref 0.1–0.9)
MONOCYTES NFR BLD AUTO: 8 %
NEUTROPHILS # BLD AUTO: 5.1 X10E3/UL (ref 1.4–7)
NEUTROPHILS NFR BLD AUTO: 67 %
NITRITE UR QL STRIP: NEGATIVE
PH UR STRIP: 6 [PH] (ref 5–7.5)
PLATELET # BLD AUTO: 245 X10E3/UL (ref 150–450)
POTASSIUM SERPL-SCNC: 4.2 MMOL/L (ref 3.5–5.2)
PROT SERPL-MCNC: 6.7 G/DL (ref 6–8.5)
PROT UR QL STRIP: NEGATIVE
RBC # BLD AUTO: 5.34 X10E6/UL (ref 4.14–5.8)
RBC #/AREA URNS HPF: NORMAL /HPF (ref 0–2)
SODIUM SERPL-SCNC: 142 MMOL/L (ref 134–144)
SP GR UR STRIP: 1.02 (ref 1–1.03)
T4 FREE SERPL-MCNC: 1.28 NG/DL (ref 0.82–1.77)
TRIGL SERPL-MCNC: 133 MG/DL (ref 0–149)
TSH SERPL DL<=0.005 MIU/L-ACNC: 1.48 UIU/ML (ref 0.45–4.5)
URINALYSIS REFLEX: NORMAL
UROBILINOGEN UR STRIP-MCNC: 0.2 MG/DL (ref 0.2–1)
VLDLC SERPL CALC-MCNC: 24 MG/DL (ref 5–40)
WBC # BLD AUTO: 7.7 X10E3/UL (ref 3.4–10.8)
WBC #/AREA URNS HPF: NORMAL /HPF (ref 0–5)

## 2022-05-20 NOTE — TELEPHONE ENCOUNTER
[facility] INSTRUCTING PT TO RETURN CALL TO BE READ LAB RESULTS. LETTER SENT VIA [employer]

## 2022-05-20 NOTE — TELEPHONE ENCOUNTER
----- Message from DEEPIKA Yarbrough sent at 5/20/2022  7:46 AM EDT -----  Mr. Bethn,    I have reviewed your recent hip xray. It shows some mild osteoarthritis without injury or fractures. Continue to take Ibuprofen/Tylenol as needed for pain. If pain unrelieved might consider a Cortisone injection per Orthopedic MD.    Sincerely,  Danuta POE

## 2022-05-23 DIAGNOSIS — M25.552 CHRONIC HIP PAIN, BILATERAL: Primary | ICD-10-CM

## 2022-05-23 DIAGNOSIS — G89.29 CHRONIC HIP PAIN, BILATERAL: Primary | ICD-10-CM

## 2022-05-23 DIAGNOSIS — M25.551 CHRONIC HIP PAIN, BILATERAL: Primary | ICD-10-CM

## 2023-02-07 ENCOUNTER — OFFICE VISIT (OUTPATIENT)
Dept: INTERNAL MEDICINE | Age: 63
End: 2023-02-07
Payer: COMMERCIAL

## 2023-02-07 VITALS
HEART RATE: 92 BPM | HEIGHT: 70 IN | OXYGEN SATURATION: 98 % | BODY MASS INDEX: 27.17 KG/M2 | WEIGHT: 189.8 LBS | DIASTOLIC BLOOD PRESSURE: 88 MMHG | SYSTOLIC BLOOD PRESSURE: 142 MMHG | TEMPERATURE: 98.6 F

## 2023-02-07 DIAGNOSIS — M16.10 ARTHRITIS PAIN OF HIP: ICD-10-CM

## 2023-02-07 DIAGNOSIS — I10 BENIGN ESSENTIAL HYPERTENSION: Primary | ICD-10-CM

## 2023-02-07 DIAGNOSIS — E78.2 MIXED HYPERLIPIDEMIA: ICD-10-CM

## 2023-02-07 PROCEDURE — 93000 ELECTROCARDIOGRAM COMPLETE: CPT | Performed by: NURSE PRACTITIONER

## 2023-02-07 PROCEDURE — 99214 OFFICE O/P EST MOD 30 MIN: CPT | Performed by: NURSE PRACTITIONER

## 2023-02-07 RX ORDER — AMLODIPINE BESYLATE 2.5 MG/1
2.5 TABLET ORAL DAILY
Qty: 30 TABLET | Refills: 3 | Status: SHIPPED | OUTPATIENT
Start: 2023-02-07 | End: 2023-03-24

## 2023-02-07 RX ORDER — LISINOPRIL 10 MG/1
10 TABLET ORAL DAILY
Qty: 90 TABLET | Refills: 0 | Status: SHIPPED | OUTPATIENT
Start: 2023-02-07 | End: 2023-02-07 | Stop reason: ALTCHOICE

## 2023-02-07 RX ORDER — GABAPENTIN 100 MG/1
100 CAPSULE ORAL 3 TIMES DAILY
Qty: 90 CAPSULE | Refills: 0 | Status: SHIPPED | OUTPATIENT
Start: 2023-02-07 | End: 2023-02-17 | Stop reason: DRUGHIGH

## 2023-02-07 NOTE — PROGRESS NOTES
"    I N T E R N A L  M E D I C I N E  Danuta Garduno, APRN    ENCOUNTER DATE:  02/07/2023    Grayson Bethn / 62 y.o. / male      CHIEF COMPLAINT / REASON FOR OFFICE VISIT     Back Pain (Radiates down hip, buttocks, and leg)      ASSESSMENT & PLAN     Diagnoses and all orders for this visit:    1. Benign essential hypertension (Primary)  -     CBC w AUTO Differential  -     Comprehensive metabolic panel  -     ECG 12 Lead    2. Mixed hyperlipidemia  -     Lipid panel    3. Arthritis pain of hip  -     Compliance Drug Analysis, Ur - Urine, Clean Catch  -     gabapentin (NEURONTIN) 100 MG capsule; Take 1 capsule by mouth 3 (Three) Times a Day.  Dispense: 90 capsule; Refill: 0    Other orders  -     amLODIPine (NORVASC) 2.5 MG tablet; Take 1 tablet by mouth Daily.  Dispense: 30 tablet; Refill: 3         SUMMARY/DISCUSSION  • Continued hypertension with a blood pressure today of 142/88.  Recent blood pressures in the office 172/102, 160/98.  Patient used to be on lisinopril but he stopped it on his own as it was causing erectile dysfunctional issues per patient.  Patient states /72 typically at home.  Patient agreeable to starting amlodipine 2.5 mg 1 tablet a day and to monitor blood pressures at home per NP.  Referral to cardiology for episodes of chest pain and abnormal EKG today in the office  • Patient recently seen by orthopedic and was told he needed a hip replacement. Patient wants to pursue other avenues prior to this.  Discussion of chronic osteoarthritis and neuropathy in his bilateral legs and feet with sciatica.  Discussion of tramadol or gabapentin and patient agreeable to gabapentin trial.  • Kai reviewed, UDS ordered, narcotic consent signed and agreeable per patient.    Return in about 3 months (around 5/7/2023) for Annual physical.      VITAL SIGNS     Visit Vitals  /88   Pulse 92   Temp 98.6 °F (37 °C) (Temporal)   Ht 177.8 cm (70\")   Wt 86.1 kg (189 lb 12.8 oz)   SpO2 98%   BMI 27.23 kg/m²     "       BP Readings from Last 3 Encounters:   02/07/23 142/88   05/19/22 160/98   11/21/20 136/86     Wt Readings from Last 3 Encounters:   02/07/23 86.1 kg (189 lb 12.8 oz)   05/19/22 87.5 kg (193 lb)   11/21/20 83.9 kg (185 lb)     Body mass index is 27.23 kg/m².    Blood pressure readings recorded on patient flowsheet:  No flowsheet data found.          MEDICATIONS AT THE TIME OF OFFICE VISIT     Current Outpatient Medications on File Prior to Visit   Medication Sig Dispense Refill   • cetirizine (zyrTEC) 10 MG tablet      • MULTIPLE VITAMIN PO Take  by mouth.     • OMEGA-3 FATTY ACIDS PO Take 1 tablet by mouth Daily.     • PROBIOTIC PRODUCT PO Take 1 tablet by mouth Daily.       No current facility-administered medications on file prior to visit.        HISTORY OF PRESENT ILLNESS     62-year-old male being seen today for concern about  chronic osteoarthritis/Back pain with sciatica to both hips, and numbness and tingling in his feet.  Patient states recently seen by orthopedic doctor and they want to do surgery.  Patient wants to pursue other avenues prior to surgery.  Patient continues to go to a physical therapist/massage therapist with some improvement of his pain.  Patient states has been taking ibuprofen, Tylenol without relief.  Patient states his pain today 8/10 mostly in his hip.  Patient states having intermittent episodes of chest pain without radiation, no nausea or vomiting, for  the past couple weeks.  States feels it is related to his job and lifting heavy boxes.     Patient Care Team:  Danuta Garduno APRN as PCP - General (Family Medicine)    REVIEW OF SYSTEMS     Review of Systems   Constitutional: Positive for activity change.   HENT: Negative.    Respiratory: Negative.    Cardiovascular: Positive for chest pain. Negative for palpitations.   Genitourinary: Negative.    Musculoskeletal: Positive for arthralgias and back pain.   Skin: Negative.           PHYSICAL EXAMINATION     Physical  Exam  Cardiovascular:      Rate and Rhythm: Regular rhythm. Tachycardia present.      Pulses: Normal pulses.      Heart sounds: Normal heart sounds.      Comments: HR 92 per NP. EKG shows HR 79/80. Episodes of chest pain for a week per patient without radiation, nausea, vomiting, or diaphoresis  Pulmonary:      Effort: Pulmonary effort is normal. No respiratory distress.      Breath sounds: Normal breath sounds. No wheezing, rhonchi or rales.   Chest:      Chest wall: No tenderness.   Musculoskeletal:         General: Tenderness present.      Right lower leg: No edema.      Left lower leg: No edema.      Comments: Bilateral lumbar tenderness with sciatica down both hips and legs, numbness and tingling in feet   Skin:     General: Skin is warm and dry.   Neurological:      Mental Status: He is alert.             REVIEWED DATA     Labs:     Lab Results   Component Value Date     05/19/2022    K 4.2 05/19/2022    CALCIUM 9.6 05/19/2022    AST 22 05/19/2022    ALT 21 05/19/2022    BUN 22 05/19/2022    CREATININE 0.90 05/19/2022    CREATININE 0.98 12/27/2019    CREATININE 0.95 05/30/2019    EGFRIFNONA 78 12/27/2019    EGFRIFAFRI 94 12/27/2019       Lab Results   Component Value Date    HGBA1C 5.0 05/19/2022       Lab Results   Component Value Date     (H) 05/19/2022     (H) 12/27/2019    LDL 97 05/30/2019    HDL 46 05/19/2022    HDL 50 12/27/2019    TRIG 133 05/19/2022    TRIG 119 12/27/2019       Lab Results   Component Value Date    TSH 1.480 05/19/2022    FREET4 1.28 05/19/2022       Lab Results   Component Value Date    WBC 7.7 05/19/2022    HGB 16.6 05/19/2022     05/19/2022       No results found for: BOBBI       Imaging:     ECG 12 Lead    Date/Time: 2/7/2023 12:00 PM  Performed by: Danuta Garduno APRN  Authorized by: Danuta Garduno APRN   Comparison: compared with previous ECG from 11/14/2016  Comparison to previous ECG: RR Interval= 779 ms  WV Interval= 140 ms  QRSD  Interval= 88 ms  QT Interval= 372 ms  QTc Interval= 421 ms  Heart Rate= 77 ms  P Axis= 59 deg  QRS Axis= -9 deg  T Wave Axis= 51 deg  I: 40 Axis= 61 deg  T: 40 Axis= -39 deg  ST Axis= -34 deg  SINUS RHYTHM  VENTRICULAR PREMATURE COMPLEX  NO PRIOR TRACING AVAILABLE FOR COMPARISON  Electronically Signed by:  Adriana Londono ([facility]) 14-Nov-2016 17:14:19  Date and Time of Study: 2016-11-14 16:48:53  Rhythm: sinus rhythm  Ectopy: infrequent PVCs  Rate: normal    Clinical impression: abnormal EKG                  Medical Tests:           Summary of old records / correspondence / consultant report:           Request outside records:           DEEPIKA Chavis

## 2023-02-08 LAB
ALBUMIN SERPL-MCNC: 4.5 G/DL (ref 3.8–4.8)
ALBUMIN/GLOB SERPL: 2.1 {RATIO} (ref 1.2–2.2)
ALP SERPL-CCNC: 90 IU/L (ref 44–121)
ALT SERPL-CCNC: 23 IU/L (ref 0–44)
AST SERPL-CCNC: 24 IU/L (ref 0–40)
BASOPHILS # BLD AUTO: 0 X10E3/UL (ref 0–0.2)
BASOPHILS NFR BLD AUTO: 1 %
BILIRUB SERPL-MCNC: 0.5 MG/DL (ref 0–1.2)
BUN SERPL-MCNC: 26 MG/DL (ref 8–27)
BUN/CREAT SERPL: 28 (ref 10–24)
CALCIUM SERPL-MCNC: 9.9 MG/DL (ref 8.6–10.2)
CHLORIDE SERPL-SCNC: 101 MMOL/L (ref 96–106)
CHOLEST SERPL-MCNC: 203 MG/DL (ref 100–199)
CO2 SERPL-SCNC: 28 MMOL/L (ref 20–29)
CREAT SERPL-MCNC: 0.93 MG/DL (ref 0.76–1.27)
EGFRCR SERPLBLD CKD-EPI 2021: 92 ML/MIN/1.73
EOSINOPHIL # BLD AUTO: 0.2 X10E3/UL (ref 0–0.4)
EOSINOPHIL NFR BLD AUTO: 3 %
ERYTHROCYTE [DISTWIDTH] IN BLOOD BY AUTOMATED COUNT: 12.5 % (ref 11.6–15.4)
GLOBULIN SER CALC-MCNC: 2.1 G/DL (ref 1.5–4.5)
GLUCOSE SERPL-MCNC: 98 MG/DL (ref 70–99)
HCT VFR BLD AUTO: 49.9 % (ref 37.5–51)
HDLC SERPL-MCNC: 52 MG/DL
HGB BLD-MCNC: 17.4 G/DL (ref 13–17.7)
IMM GRANULOCYTES # BLD AUTO: 0 X10E3/UL (ref 0–0.1)
IMM GRANULOCYTES NFR BLD AUTO: 0 %
LDLC SERPL CALC-MCNC: 125 MG/DL (ref 0–99)
LYMPHOCYTES # BLD AUTO: 1.8 X10E3/UL (ref 0.7–3.1)
LYMPHOCYTES NFR BLD AUTO: 24 %
MCH RBC QN AUTO: 30.7 PG (ref 26.6–33)
MCHC RBC AUTO-ENTMCNC: 34.9 G/DL (ref 31.5–35.7)
MCV RBC AUTO: 88 FL (ref 79–97)
MONOCYTES # BLD AUTO: 0.6 X10E3/UL (ref 0.1–0.9)
MONOCYTES NFR BLD AUTO: 8 %
NEUTROPHILS # BLD AUTO: 4.8 X10E3/UL (ref 1.4–7)
NEUTROPHILS NFR BLD AUTO: 64 %
PLATELET # BLD AUTO: 249 X10E3/UL (ref 150–450)
POTASSIUM SERPL-SCNC: 5.8 MMOL/L (ref 3.5–5.2)
PROT SERPL-MCNC: 6.6 G/DL (ref 6–8.5)
RBC # BLD AUTO: 5.66 X10E6/UL (ref 4.14–5.8)
SODIUM SERPL-SCNC: 140 MMOL/L (ref 134–144)
TRIGL SERPL-MCNC: 149 MG/DL (ref 0–149)
VLDLC SERPL CALC-MCNC: 26 MG/DL (ref 5–40)
WBC # BLD AUTO: 7.5 X10E3/UL (ref 3.4–10.8)

## 2023-02-14 LAB — DRUGS UR: NORMAL

## 2023-02-17 DIAGNOSIS — G62.9 POLYNEUROPATHY: Primary | ICD-10-CM

## 2023-02-17 RX ORDER — GABAPENTIN 300 MG/1
300 CAPSULE ORAL 3 TIMES DAILY
Qty: 90 CAPSULE | Refills: 0 | Status: SHIPPED | OUTPATIENT
Start: 2023-02-17 | End: 2023-03-29 | Stop reason: SDUPTHER

## 2023-03-24 ENCOUNTER — OFFICE VISIT (OUTPATIENT)
Dept: CARDIOLOGY | Facility: CLINIC | Age: 63
End: 2023-03-24
Payer: COMMERCIAL

## 2023-03-24 ENCOUNTER — TELEPHONE (OUTPATIENT)
Dept: CARDIOLOGY | Facility: CLINIC | Age: 63
End: 2023-03-24
Payer: COMMERCIAL

## 2023-03-24 VITALS
SYSTOLIC BLOOD PRESSURE: 170 MMHG | DIASTOLIC BLOOD PRESSURE: 100 MMHG | WEIGHT: 184.4 LBS | HEART RATE: 90 BPM | BODY MASS INDEX: 26.4 KG/M2 | HEIGHT: 70 IN

## 2023-03-24 DIAGNOSIS — E78.2 MIXED HYPERLIPIDEMIA: ICD-10-CM

## 2023-03-24 DIAGNOSIS — I10 BENIGN ESSENTIAL HYPERTENSION: Primary | ICD-10-CM

## 2023-03-24 PROCEDURE — 99204 OFFICE O/P NEW MOD 45 MIN: CPT | Performed by: INTERNAL MEDICINE

## 2023-03-24 PROCEDURE — 93000 ELECTROCARDIOGRAM COMPLETE: CPT | Performed by: INTERNAL MEDICINE

## 2023-03-24 RX ORDER — AMLODIPINE BESYLATE 5 MG/1
5 TABLET ORAL DAILY
Qty: 30 TABLET | Refills: 11 | Status: SHIPPED | OUTPATIENT
Start: 2023-03-24

## 2023-03-24 RX ORDER — ATORVASTATIN CALCIUM 20 MG/1
20 TABLET, FILM COATED ORAL DAILY
Qty: 90 TABLET | Refills: 3 | Status: SHIPPED | OUTPATIENT
Start: 2023-03-24

## 2023-03-24 NOTE — TELEPHONE ENCOUNTER
Attempted to call pt but was not successful. Pt has a voice mail that is not set up. Will try to call again.

## 2023-03-24 NOTE — TELEPHONE ENCOUNTER
----- Message from Frederic Garces MD sent at 3/24/2023 11:39 AM EDT -----  Please advise Mr Ortega to start taking statin till he completes vascular screening. I have called in for atorvastatin -   Call with bp logs in 1 week    Thanks

## 2023-03-24 NOTE — PROGRESS NOTES
PATIENTINFORMATION    Date of Office Visit: 2023  Encounter Provider: Frederic Garces MD  Place of Service: [facility] CARDIOLOGY  Patient Name: Grayson Ortega  : 1960    Subjective:     Encounter Date:2023      Patient ID: Grayson Ortega is a 63 y.o. male.    No chief complaint on file.    HPI  Mr. Ortega is a pleasant 64 yo with past medical history of hypertension, osteoarthritis of left knee and low back pain referred to cardiology clinic for evaluation of abnormal EKG.  He is very active and also exercise regularly doing stationary bike, elliptical and strength exercises without any chest pain or shortness of breath. Rather activities limited by left hip pain for which he has been getting steroid injections as well as oral steroids.  He just completed a course of methylprednisolone few days ago.  He has been on amlodipine 2.5 mg p.o. daily since last month for hypertension.  He reports his blood pressure used to run normal 3 times daily started having pain issues.  Today in office blood pressure was in the 160s/100 mmHg on both arms.  I have reviewed recent blood pressures doing office visits with PCP-elevated with systolic in the 140s.  He  denies tobacco use, recreational drugs or alcohol abuse.  No family history of premature coronary artery disease.  He has history of elevated cholesterol since he was very young.      ROS  All systems reviewed and negative except as noted in HPI.    Past Medical History:   Diagnosis Date   • Acute UTI (urinary tract infection) 3/7/2019   • Allergic    • Arthritis    • BPH (benign prostatic hypertrophy)    • Difficulty in urination     SOMETIMES AT NIGHT   • Hematuria    • HL (hearing loss)    • Hyperlipidemia    • Hypertension    • Inguinal hernia    • Visual impairment        Past Surgical History:   Procedure Laterality Date   • HERNIA REPAIR     • INGUINAL HERNIA REPAIR Left 2016    Procedure: INGUINAL HERNIA REPAIR LAPAROSCOPIC;   "Surgeon: Keron Pérez MD;  Location: [facility];  Service:    • INGUINAL HERNIA REPAIR     • PROSTATE SURGERY  2019       Social History     Socioeconomic History   • Marital status:    Tobacco Use   • Smokeless tobacco: Never   Substance and Sexual Activity   • Alcohol use: Yes     Types: 1 - 2 Glasses of wine, 2 - 3 Cans of beer, 1 Standard drinks or equivalent per week     Comment: 1 drink per day   • Drug use: No   • Sexual activity: Yes     Partners: Female     Birth control/protection: Post-menopausal       Family History   Problem Relation Age of Onset   • Cancer Mother    • Hypertension Mother    • Cancer Father    • Hypertension Father            ECG 12 Lead    Date/Time: 3/24/2023 10:18 AM  Performed by: Frederic Garces MD  Authorized by: Frederic Garces MD   Comparison: compared with previous ECG from 2/8/2023  Similar to previous ECG  Rhythm: sinus rhythm  Rate: normal  Conduction: conduction normal  ST Segments: ST segments normal  T Waves: T waves normal  QRS axis: left  Other: no other findings    Clinical impression: abnormal EKG               Objective:     /100   Pulse 90   Ht 177.8 cm (70\")   Wt 83.6 kg (184 lb 6.4 oz)   BMI 26.46 kg/m²  Body mass index is 26.46 kg/m².     Constitutional:       General: Not in acute distress.     Appearance: Well-developed. Not diaphoretic.   Eyes:      Pupils: Pupils are equal, round, and reactive to light.   HENT:      Head: Normocephalic and atraumatic.   Neck:      Thyroid: No thyromegaly.   Pulmonary:      Effort: Pulmonary effort is normal. No respiratory distress.      Breath sounds: Normal breath sounds. No wheezing. No rales.   Chest:      Chest wall: Not tender to palpatation.   Cardiovascular:      Normal rate. Regular rhythm.      No gallop.   Pulses:     Intact distal pulses.   Edema:     Peripheral edema absent.   Abdominal:      General: Bowel sounds are normal. There is no distension.      Palpations: Abdomen is " soft.      Tenderness: There is no guarding.   Musculoskeletal: Normal range of motion.         General: No deformity.      Cervical back: Normal range of motion and neck supple. Skin:     General: Skin is warm and dry.      Findings: No rash.   Neurological:      Mental Status: Alert and oriented to person, place, and time.      Cranial Nerves: No cranial nerve deficit.      Deep Tendon Reflexes: Reflexes are normal and symmetric.   Psychiatric:         Judgment: Judgment normal.         Review Of Data: I have reviewed pertinent recent labs, images and documents and pertinent findings included in HPI or assessment below.    Lipid Panel    Lipid Panel 5/19/22 2/7/23   Total Cholesterol 185 203 (A)   Triglycerides 133 149   HDL Cholesterol 46 52   VLDL Cholesterol 24 26   LDL Cholesterol  115 (A) 125 (A)   (A) Abnormal value                Assessment/Plan:         1.  EKG abnormality--left axis deviation noted without any other significant abnormalities.  He does not have any concerning cardiovascular symptoms and exercise regularly without symptoms   No further cardiac testing recommended at this point    2.  Essential hypertension-likely exacerbated by ongoing pain and steroid use.  Blood pressure significantly elevated today  I have increased amlodipine to 5 mg p.o. daily and he will monitor his blood pressure twice a day and if persistently elevated he will increase his amlodipine to 10.  He probably needs a second agent like a thiazide diuretic or ACE/ARB  He can follow-up with his PCP.    3.   longstanding hypercholesterolemia-ASCVD risk calculator put him at high risk for atherosclerotic( >20%)-he would benefit from starting statin.  He is interested in getting vascular screening including coronary artery, coronary calcium score.  Provided information for [facility].    4.  Osteoarthritis of left hip-follows up with orthopedic surgery.        Diagnosis and plan of care discussed with patient and verbalized  understanding.            Your medication list          Accurate as of March 24, 2023 11:38 AM. If you have any questions, ask your nurse or doctor.            START taking these medications      Instructions Last Dose Given Next Dose Due   atorvastatin 20 MG tablet  Commonly known as: LIPITOR  Started by: Frederic Garces MD      Take 1 tablet by mouth Daily.          CHANGE how you take these medications      Instructions Last Dose Given Next Dose Due   amLODIPine 5 MG tablet  Commonly known as: NORVASC  What changed:   · medication strength  · how much to take  Changed by: Frederic Garces MD      Take 1 tablet by mouth Daily.          CONTINUE taking these medications      Instructions Last Dose Given Next Dose Due   cetirizine 10 MG tablet  Commonly known as: zyrTEC           gabapentin 300 MG capsule  Commonly known as: NEURONTIN      Take 1 capsule by mouth 3 (Three) Times a Day.       multivitamin tablet tablet  Commonly known as: THERAGRAN      Take  by mouth.       OMEGA-3 FATTY ACIDS PO      Take 1 tablet by mouth Daily.       PROBIOTIC PRODUCT PO      Take 1 tablet by mouth Daily.             Where to Get Your Medications      These medications were sent to [facility] [id number] - [place], [place] - [address] AT [facility]. & [address] - [phone]  - [phone]   [address] [place] [address]    Phone: [phone]   · amLODIPine 5 MG tablet  · atorvastatin 20 MG tablet             Frederic Garces MD  03/24/23  11:38 EDT

## 2023-03-27 NOTE — TELEPHONE ENCOUNTER
Grayson Ortega returned call.  Reviewed Dr. Garces's message with patient and he verbalized understanding.    Requested patient provide BP log to office via MyChart or phone in 1 week.  Patient stated he will do this.    Thank you,  Kristie BRADY RN  Triage Nurse [facility]

## 2023-03-29 DIAGNOSIS — G62.9 POLYNEUROPATHY: ICD-10-CM

## 2023-03-29 RX ORDER — GABAPENTIN 300 MG/1
300 CAPSULE ORAL 3 TIMES DAILY
Qty: 90 CAPSULE | Refills: 0 | Status: SHIPPED | OUTPATIENT
Start: 2023-03-29

## 2023-04-24 DIAGNOSIS — Z00.00 LABORATORY TESTS ORDERED AS PART OF A COMPLETE PHYSICAL EXAM (CPE): Primary | ICD-10-CM

## 2023-04-25 DIAGNOSIS — G62.9 POLYNEUROPATHY: ICD-10-CM

## 2023-04-25 RX ORDER — GABAPENTIN 300 MG/1
CAPSULE ORAL
Qty: 90 CAPSULE | Refills: 0 | Status: SHIPPED | OUTPATIENT
Start: 2023-04-29

## 2023-04-26 ENCOUNTER — TELEPHONE (OUTPATIENT)
Dept: INTERNAL MEDICINE | Age: 63
End: 2023-04-26
Payer: COMMERCIAL

## 2023-05-05 LAB
ALBUMIN SERPL-MCNC: 4.3 G/DL (ref 3.5–5.2)
ALBUMIN/GLOB SERPL: 1.9 G/DL
ALP SERPL-CCNC: 107 U/L (ref 39–117)
ALT SERPL-CCNC: 27 U/L (ref 1–41)
APPEARANCE UR: CLEAR
AST SERPL-CCNC: 23 U/L (ref 1–40)
BASOPHILS # BLD AUTO: 0.03 10*3/MM3 (ref 0–0.2)
BASOPHILS NFR BLD AUTO: 0.6 % (ref 0–1.5)
BILIRUB SERPL-MCNC: 0.7 MG/DL (ref 0–1.2)
BILIRUB UR QL STRIP: NEGATIVE
BUN SERPL-MCNC: 17 MG/DL (ref 8–23)
BUN/CREAT SERPL: 19.1 (ref 7–25)
CALCIUM SERPL-MCNC: 10 MG/DL (ref 8.6–10.5)
CHLORIDE SERPL-SCNC: 104 MMOL/L (ref 98–107)
CHOLEST SERPL-MCNC: 144 MG/DL (ref 0–200)
CO2 SERPL-SCNC: 28.9 MMOL/L (ref 22–29)
COLOR UR: YELLOW
CREAT SERPL-MCNC: 0.89 MG/DL (ref 0.76–1.27)
EGFRCR SERPLBLD CKD-EPI 2021: 95.1 ML/MIN/1.73
EOSINOPHIL # BLD AUTO: 0.2 10*3/MM3 (ref 0–0.4)
EOSINOPHIL NFR BLD AUTO: 3.8 % (ref 0.3–6.2)
ERYTHROCYTE [DISTWIDTH] IN BLOOD BY AUTOMATED COUNT: 13.4 % (ref 12.3–15.4)
GLOBULIN SER CALC-MCNC: 2.3 GM/DL
GLUCOSE SERPL-MCNC: 85 MG/DL (ref 65–99)
GLUCOSE UR QL STRIP: NEGATIVE
HBA1C MFR BLD: 4.8 % (ref 4.8–5.6)
HCT VFR BLD AUTO: 44.6 % (ref 37.5–51)
HDLC SERPL-MCNC: 58 MG/DL (ref 40–60)
HGB BLD-MCNC: 15.6 G/DL (ref 13–17.7)
HGB UR QL STRIP: NEGATIVE
IMM GRANULOCYTES # BLD AUTO: 0.02 10*3/MM3 (ref 0–0.05)
IMM GRANULOCYTES NFR BLD AUTO: 0.4 % (ref 0–0.5)
KETONES UR QL STRIP: NEGATIVE
LDLC SERPL CALC-MCNC: 72 MG/DL (ref 0–100)
LDLC/HDLC SERPL: 1.23 {RATIO}
LEUKOCYTE ESTERASE UR QL STRIP: NEGATIVE
LYMPHOCYTES # BLD AUTO: 1.45 10*3/MM3 (ref 0.7–3.1)
LYMPHOCYTES NFR BLD AUTO: 27.6 % (ref 19.6–45.3)
MCH RBC QN AUTO: 30.6 PG (ref 26.6–33)
MCHC RBC AUTO-ENTMCNC: 35 G/DL (ref 31.5–35.7)
MCV RBC AUTO: 87.6 FL (ref 79–97)
MONOCYTES # BLD AUTO: 0.43 10*3/MM3 (ref 0.1–0.9)
MONOCYTES NFR BLD AUTO: 8.2 % (ref 5–12)
NEUTROPHILS # BLD AUTO: 3.13 10*3/MM3 (ref 1.7–7)
NEUTROPHILS NFR BLD AUTO: 59.4 % (ref 42.7–76)
NITRITE UR QL STRIP: NEGATIVE
NRBC BLD AUTO-RTO: 0 /100 WBC (ref 0–0.2)
PH UR STRIP: 7.5 [PH] (ref 5–8)
PLATELET # BLD AUTO: 243 10*3/MM3 (ref 140–450)
POTASSIUM SERPL-SCNC: 4.6 MMOL/L (ref 3.5–5.2)
PROT SERPL-MCNC: 6.6 G/DL (ref 6–8.5)
PROT UR QL STRIP: NEGATIVE
RBC # BLD AUTO: 5.09 10*6/MM3 (ref 4.14–5.8)
SODIUM SERPL-SCNC: 140 MMOL/L (ref 136–145)
SP GR UR STRIP: 1.01 (ref 1–1.03)
TRIGL SERPL-MCNC: 72 MG/DL (ref 0–150)
TSH SERPL DL<=0.005 MIU/L-ACNC: 1.14 UIU/ML (ref 0.27–4.2)
UROBILINOGEN UR STRIP-MCNC: NORMAL MG/DL
VLDLC SERPL CALC-MCNC: 14 MG/DL (ref 5–40)
WBC # BLD AUTO: 5.26 10*3/MM3 (ref 3.4–10.8)

## 2023-05-12 ENCOUNTER — OFFICE VISIT (OUTPATIENT)
Dept: INTERNAL MEDICINE | Age: 63
End: 2023-05-12
Payer: COMMERCIAL

## 2023-05-12 VITALS
OXYGEN SATURATION: 100 % | WEIGHT: 183.2 LBS | HEART RATE: 68 BPM | DIASTOLIC BLOOD PRESSURE: 88 MMHG | HEIGHT: 70 IN | BODY MASS INDEX: 26.23 KG/M2 | SYSTOLIC BLOOD PRESSURE: 168 MMHG | TEMPERATURE: 98 F

## 2023-05-12 DIAGNOSIS — I10 BENIGN ESSENTIAL HYPERTENSION: ICD-10-CM

## 2023-05-12 DIAGNOSIS — E78.2 MIXED HYPERLIPIDEMIA: ICD-10-CM

## 2023-05-12 DIAGNOSIS — Z00.00 ANNUAL PHYSICAL EXAM: Primary | ICD-10-CM

## 2023-05-12 NOTE — ASSESSMENT & PLAN NOTE
Continue Omega 3 fatty acids daily as prescribed and Atorvastatin 20 mg daily    Most recent labs:   Lab Results   Component Value Date    LDL 72 05/05/2023     (H) 02/07/2023     (H) 05/19/2022    HDL 58 05/05/2023    HDL 52 02/07/2023    HDL 46 05/19/2022    TRIG 72 05/05/2023    CHOLHDLRATIO 4.0 05/19/2022

## 2023-05-12 NOTE — ASSESSMENT & PLAN NOTE
Patient states home blood pressures typically 134//80. Patient asymptomatic and wonder about possible white coat syndrome. Has been to Cardiology.   Continue Amlodipine 5 mg daily and patient to record daily blood pressures for NP and send them to me in a week

## 2023-05-12 NOTE — PROGRESS NOTES
"    I N T E R N A L  M E D I C I N E    Danuta Garduno, DARLING      ENCOUNTER DATE:  2023    Grayson [place] / 63 y.o. / male    CHIEF COMPLAINT     Annual Exam      VITALS     Vitals:    23 1417   BP: 168/88   BP Location: Left arm   Patient Position: Sitting   Cuff Size: Adult   Pulse: 68   Temp: 98 °F (36.7 °C)   TempSrc: Temporal   SpO2: 100%   Weight: 83.1 kg (183 lb 3.2 oz)   Height: 177.8 cm (70\")       BP Readings from Last 3 Encounters:   23 168/88   23 170/100   23 142/88     Wt Readings from Last 3 Encounters:   23 83.1 kg (183 lb 3.2 oz)   23 83.6 kg (184 lb 6.4 oz)   23 86.1 kg (189 lb 12.8 oz)      Body mass index is 26.29 kg/m².    Blood pressure readings recorded on patient flowsheet:       View : No data to display.                MEDICATIONS     Current Outpatient Medications on File Prior to Visit   Medication Sig Dispense Refill   • amLODIPine (NORVASC) 5 MG tablet Take 1 tablet by mouth Daily. 30 tablet 11   • atorvastatin (LIPITOR) 20 MG tablet Take 1 tablet by mouth Daily. 90 tablet 3   • cetirizine (zyrTEC) 10 MG tablet      • gabapentin (NEURONTIN) 300 MG capsule TAKE ONE CAPSULE BY MOUTH THREE TIMES A DAY 90 capsule 0   • MULTIPLE VITAMIN PO Take  by mouth.     • OMEGA-3 FATTY ACIDS PO Take 1 tablet by mouth Daily.     • PROBIOTIC PRODUCT PO Take 1 tablet by mouth Daily.       No current facility-administered medications on file prior to visit.         HISTORY OF PRESENT ILLNESS      Grayson presents for annual health maintenance visit.      · General health: good  · Lifestyle:  · Attempting to lose weight?: No   · Diet: eats a well balanced, healthy diet  · Exercise: exercises nearly every day  · Tobacco: Never used   · Alcohol: occasional/infrequent  · Work: Full-time  · Reproductive health:  · Sexually active?: Yes   · Concern for STD?: No   · Sexual problems?: No problems   · Sees Urologist?: Yes   · Depression Screenin/12/2023     2:16 PM "   PHQ-2/PHQ-9 Depression Screening   Little Interest or Pleasure in Doing Things 0-->not at all   Feeling Down, Depressed or Hopeless 0-->not at all   PHQ-9: Brief Depression Severity Measure Score 0         PHQ-2: 0 (Not depressed)     PHQ-9: 0 (Negative screening for depression)    Patient Care Team:  Danuta Garduno APRN as PCP - General (Family Medicine)  ______________________________________________________________________    ALLERGIES  Allergies   Allergen Reactions   • Codeine Nausea And Vomiting   • Oxycodone Nausea And Vomiting        PFSH:     The following portions of the patient's history were reviewed and updated as appropriate: Allergies / Current Medications / Past Medical History / Surgical History / Social History / Family History    PROBLEM LIST   Patient Active Problem List   Diagnosis   • Benign essential hypertension   • Hyperlipidemia   • Elevated PSA   • Arthritis pain of hip   • Annual physical exam       PAST MEDICAL HISTORY  Past Medical History:   Diagnosis Date   • Acute UTI (urinary tract infection) 03/07/2019   • Allergic    • Arthritis    • BPH (benign prostatic hypertrophy)    • Difficulty in urination     SOMETIMES AT NIGHT   • Heart murmur childhood    was told by parent   • Hematuria    • HL (hearing loss)    • Hyperlipidemia    • Hypertension    • Inguinal hernia    • Kidney stone 2/23/2019    Surgery to remove 3/2/2019   • Low back pain    • Visual impairment        SURGICAL HISTORY  Past Surgical History:   Procedure Laterality Date   • HERNIA REPAIR     • INGUINAL HERNIA REPAIR Left 11/18/2016    Procedure: INGUINAL HERNIA REPAIR LAPAROSCOPIC;  Surgeon: Keron Pérez MD;  Location: [facility];  Service:    • INGUINAL HERNIA REPAIR     • PROSTATE SURGERY  2019       SOCIAL HISTORY  Social History     Socioeconomic History   • Marital status:    Tobacco Use   • Smoking status: Never   • Smokeless tobacco: Never   Substance and Sexual Activity   • Alcohol use: Yes      Alcohol/week: 4.0 - 6.0 standard drinks     Types: 1 - 2 Glasses of wine, 2 - 3 Cans of beer, 1 Drinks containing 0.5 oz of alcohol per week     Comment: 1 drink per day   • Drug use: No   • Sexual activity: Yes     Partners: Female     Birth control/protection: Post-menopausal       FAMILY HISTORY  Family History   Problem Relation Age of Onset   • Cancer Mother    • Hypertension Mother    • Cancer Father    • Hypertension Father        IMMUNIZATION HISTORY  Immunization History   Administered Date(s) Administered   • Tdap 05/12/2023         REVIEW OF SYSTEMS     Review of Systems   Constitutional: Negative for activity change, appetite change, chills, fatigue and fever.   HENT: Negative.    Respiratory: Negative for cough, chest tightness, shortness of breath and wheezing.    Cardiovascular: Negative for chest pain, palpitations and leg swelling.   Gastrointestinal: Negative for abdominal distention, abdominal pain, constipation, diarrhea, nausea and vomiting.   Genitourinary: Negative.    Musculoskeletal: Positive for arthralgias.   Allergic/Immunologic: Positive for environmental allergies.   Neurological: Negative for dizziness, weakness, light-headedness, numbness and headaches.   Hematological: Negative.    Psychiatric/Behavioral: Negative.        PHYSICAL EXAMINATION     Physical Exam  Constitutional:       General: He is not in acute distress.     Appearance: Normal appearance.   HENT:      Head: Normocephalic.      Right Ear: Tympanic membrane, ear canal and external ear normal.      Left Ear: Tympanic membrane, ear canal and external ear normal.      Nose: Nose normal. No congestion or rhinorrhea.      Mouth/Throat:      Mouth: Mucous membranes are moist.      Pharynx: Oropharynx is clear. No oropharyngeal exudate or posterior oropharyngeal erythema.   Eyes:      General: No scleral icterus.     Conjunctiva/sclera: Conjunctivae normal.      Pupils: Pupils are equal, round, and reactive to light.    Cardiovascular:      Rate and Rhythm: Normal rate and regular rhythm.      Pulses: Normal pulses.      Heart sounds: Normal heart sounds.   Pulmonary:      Effort: Pulmonary effort is normal. No respiratory distress.      Breath sounds: Normal breath sounds.   Abdominal:      General: Bowel sounds are normal. There is no distension.      Palpations: Abdomen is soft.      Tenderness: There is no abdominal tenderness.   Musculoskeletal:         General: No swelling or tenderness. Normal range of motion.      Cervical back: Normal range of motion and neck supple. No rigidity.      Right lower leg: No edema.      Left lower leg: No edema.   Lymphadenopathy:      Cervical: No cervical adenopathy.   Skin:     General: Skin is warm and dry.      Capillary Refill: Capillary refill takes less than 2 seconds.   Neurological:      General: No focal deficit present.      Mental Status: He is alert and oriented to person, place, and time. Mental status is at baseline.      Cranial Nerves: No cranial nerve deficit.   Psychiatric:         Mood and Affect: Mood normal.         Behavior: Behavior normal.         Thought Content: Thought content normal.         Judgment: Judgment normal.         REVIEWED DATA      Labs:    Lab Results   Component Value Date     05/05/2023    K 4.6 05/05/2023    CALCIUM 10.0 05/05/2023    AST 23 05/05/2023    ALT 27 05/05/2023    BUN 17 05/05/2023    CREATININE 0.89 05/05/2023    CREATININE 0.93 02/07/2023    CREATININE 0.90 05/19/2022    EGFRIFNONA 78 12/27/2019    EGFRIFAFRI 94 12/27/2019       Lab Results   Component Value Date    GLUCOSE 85 05/05/2023    HGBA1C 4.80 05/05/2023    HGBA1C 5.0 05/19/2022    TSH 1.140 05/05/2023    FREET4 1.28 05/19/2022       Lab Results   Component Value Date    PSA 4.140 (H) 11/03/2017       No results found for: TESTOSTERONE, TESTOSTEROTT, TESTFRE    Lab Results   Component Value Date    LDL 72 05/05/2023    HDL 58 05/05/2023    TRIG 72 05/05/2023     CHOLHDLRATIO 4.0 05/19/2022       No components found for: [id number]    Lab Results   Component Value Date    WBC 5.26 05/05/2023    HGB 15.6 05/05/2023    MCV 87.6 05/05/2023     05/05/2023       Lab Results   Component Value Date    PROTEIN Negative 05/05/2023    GLUCOSEU Negative 05/05/2023    BLOODU Negative 05/05/2023    NITRITEU Negative 05/05/2023    LEUKOCYTESUR Negative 05/05/2023        No results found for: HEPCVIRUSABY    Imaging:           Medical Tests:           ASSESSMENT & PLAN     ANNUAL WELLNESS EXAM / PHYSICAL     Other medical problems addressed today:  Problem List Items Addressed This Visit        Cardiac and Vasculature    Benign essential hypertension    Current Assessment & Plan     Patient states home blood pressures typically 134//80. Patient asymptomatic and wonder about possible white coat syndrome. Has been to Cardiology.   Continue Amlodipine 5 mg daily and patient to record daily blood pressures for NP and send them to me in a week         Relevant Medications    amLODIPine (NORVASC) 5 MG tablet    Hyperlipidemia    Current Assessment & Plan     Continue Omega 3 fatty acids daily as prescribed and Atorvastatin 20 mg daily    Most recent labs:   Lab Results   Component Value Date    LDL 72 05/05/2023     (H) 02/07/2023     (H) 05/19/2022    HDL 58 05/05/2023    HDL 52 02/07/2023    HDL 46 05/19/2022    TRIG 72 05/05/2023    CHOLHDLRATIO 4.0 05/19/2022              Relevant Medications    atorvastatin (LIPITOR) 20 MG tablet       Health Encounters    Annual physical exam - Primary       Return in about 4 weeks (around 6/9/2023) for Next scheduled follow up.  Recheck Blood pressure    HEALTHCARE MAINTENANCE ISSUES       Cancer Screening:  · Colon: Initial/Next screening at age: 48  · Repeat colon cancer screening: every 10 years  · Prostate: Patient defers screening (AMBER & PSA)  · Testicular: Recommended monthly self exam  · Skin: Monthly self skin examination,  annual exam by health professional  · Lung: Does not meet criteria for lung cancer screening.   · Other:    Screening Labs & Tests:  · Lab results reviewed & discussed with with patient or orders placed today.  · EKG:  · CV Screening: No special screening needed  · DEXA (75+ or risk factors):   · HEP C (If born [id number] or risk factors): Negative screen  · Other:     Immunization/Vaccinations (to be given today unless deferred by patient)  · Influenza: Patient had the flu shot this season  · Hepatitis A: Not needed at this time  · Hepatitis B: Not needed at this time  · Tetanus/Pertussis: Administer today  · Pneumococcal: Declined by patient  · Shingles: Patient declines vaccine  · COVID: Plans to not take the vaccine  Lifestyle Counseling:  · Lifestyle Modifications: Attempt to lose weight, Continue good lifestyle choices/modifications, Improve dietary compliance, Increase intensity/regularity of aerobic exercise, Follow a low fat, low cholesterol diet, Maintain low sodium diet (< 3 gm) for blood pressure, Decrease or avoid alcohol intake and Reduce exposure to stress if possible  · Safety Issues: Always wear seatbelt, Avoid texting while driving   · Use sunscreen, regular skin examination  · Recommended annual dental/vision examination.  · Emotional/Stress/Sleep: Reviewed and  given when appropriate      Health Maintenance   Topic Date Due   • ZOSTER VACCINE (1 of 2) 05/12/2023 (Originally 2/21/2008)   • COVID-19 Vaccine (1) 05/14/2023 (Originally 1960)   • Pneumococcal Vaccine 65+ (1 - PCV) 05/12/2024 (Originally 2/21/2023)   • INFLUENZA VACCINE  08/01/2023   • LIPID PANEL  05/05/2024   • ANNUAL PHYSICAL  05/12/2024   • COLORECTAL CANCER SCREENING  12/27/2029   • TDAP/TD VACCINES (2 - Td or Tdap) 05/12/2033   • HEPATITIS C SCREENING  Addressed           *Examiner was wearing KN95 mask during the entire duration of the visit. Patient was masked the entire time. Minimum social distance of 6 ft  maintained entire visit except if physical contact was necessary as documented.

## 2023-05-30 DIAGNOSIS — G62.9 POLYNEUROPATHY: ICD-10-CM

## 2023-05-31 RX ORDER — GABAPENTIN 300 MG/1
CAPSULE ORAL
Qty: 90 CAPSULE | Refills: 0 | Status: SHIPPED | OUTPATIENT
Start: 2023-05-31

## 2023-08-03 ENCOUNTER — LAB (OUTPATIENT)
Dept: LAB | Facility: HOSPITAL | Age: 63
End: 2023-08-03
Payer: COMMERCIAL

## 2023-08-03 ENCOUNTER — HOSPITAL ENCOUNTER (OUTPATIENT)
Dept: CARDIOLOGY | Facility: HOSPITAL | Age: 63
Discharge: HOME | End: 2023-08-03
Payer: COMMERCIAL

## 2023-08-03 ENCOUNTER — TRANSCRIBE ORDERS (OUTPATIENT)
Dept: LAB | Facility: HOSPITAL | Age: 63
End: 2023-08-03
Payer: COMMERCIAL

## 2023-08-03 ENCOUNTER — DOCUMENTATION (OUTPATIENT)
Dept: INTERNAL MEDICINE | Age: 63
End: 2023-08-03
Payer: COMMERCIAL

## 2023-08-03 DIAGNOSIS — Z01.812 PRE-OPERATIVE LABORATORY EXAMINATION: Primary | ICD-10-CM

## 2023-08-03 DIAGNOSIS — M16.12 PRIMARY OSTEOARTHRITIS OF LEFT HIP: ICD-10-CM

## 2023-08-03 DIAGNOSIS — Z01.812 PRE-OPERATIVE LABORATORY EXAMINATION: ICD-10-CM

## 2023-08-03 LAB
ANION GAP SERPL CALCULATED.3IONS-SCNC: 11.5 MMOL/L (ref 5–15)
BASOPHILS # BLD AUTO: 0.04 10*3/MM3 (ref 0–0.2)
BASOPHILS NFR BLD AUTO: 0.6 % (ref 0–1.5)
BUN SERPL-MCNC: 19 MG/DL (ref 8–23)
BUN/CREAT SERPL: 23.2 (ref 7–25)
CALCIUM SPEC-SCNC: 9.6 MG/DL (ref 8.6–10.5)
CHLORIDE SERPL-SCNC: 105 MMOL/L (ref 98–107)
CO2 SERPL-SCNC: 26.5 MMOL/L (ref 22–29)
CREAT SERPL-MCNC: 0.82 MG/DL (ref 0.76–1.27)
DEPRECATED RDW RBC AUTO: 38.5 FL (ref 37–54)
EGFRCR SERPLBLD CKD-EPI 2021: 97.5 ML/MIN/1.73
EOSINOPHIL # BLD AUTO: 0.13 10*3/MM3 (ref 0–0.4)
EOSINOPHIL NFR BLD AUTO: 1.8 % (ref 0.3–6.2)
ERYTHROCYTE [DISTWIDTH] IN BLOOD BY AUTOMATED COUNT: 12.2 % (ref 12.3–15.4)
GLUCOSE SERPL-MCNC: 88 MG/DL (ref 65–99)
HCT VFR BLD AUTO: 44.2 % (ref 37.5–51)
HGB BLD-MCNC: 15.4 G/DL (ref 13–17.7)
IMM GRANULOCYTES # BLD AUTO: 0.02 10*3/MM3 (ref 0–0.05)
IMM GRANULOCYTES NFR BLD AUTO: 0.3 % (ref 0–0.5)
LYMPHOCYTES # BLD AUTO: 1.53 10*3/MM3 (ref 0.7–3.1)
LYMPHOCYTES NFR BLD AUTO: 21.8 % (ref 19.6–45.3)
MCH RBC QN AUTO: 30.2 PG (ref 26.6–33)
MCHC RBC AUTO-ENTMCNC: 34.8 G/DL (ref 31.5–35.7)
MCV RBC AUTO: 86.7 FL (ref 79–97)
MONOCYTES # BLD AUTO: 0.45 10*3/MM3 (ref 0.1–0.9)
MONOCYTES NFR BLD AUTO: 6.4 % (ref 5–12)
NEUTROPHILS NFR BLD AUTO: 4.86 10*3/MM3 (ref 1.7–7)
NEUTROPHILS NFR BLD AUTO: 69.1 % (ref 42.7–76)
NRBC BLD AUTO-RTO: 0 /100 WBC (ref 0–0.2)
PLATELET # BLD AUTO: 234 10*3/MM3 (ref 140–450)
PMV BLD AUTO: 9.7 FL (ref 6–12)
POTASSIUM SERPL-SCNC: 4.2 MMOL/L (ref 3.5–5.2)
QT INTERVAL: 400 MS
RBC # BLD AUTO: 5.1 10*6/MM3 (ref 4.14–5.8)
SODIUM SERPL-SCNC: 143 MMOL/L (ref 136–145)
WBC NRBC COR # BLD: 7.03 10*3/MM3 (ref 3.4–10.8)

## 2023-08-03 PROCEDURE — 80048 BASIC METABOLIC PNL TOTAL CA: CPT

## 2023-08-03 PROCEDURE — 85025 COMPLETE CBC W/AUTO DIFF WBC: CPT

## 2023-08-03 PROCEDURE — 93005 ELECTROCARDIOGRAM TRACING: CPT | Performed by: ORTHOPAEDIC SURGERY

## 2023-08-03 PROCEDURE — 36415 COLL VENOUS BLD VENIPUNCTURE: CPT

## 2023-08-09 DIAGNOSIS — G62.9 POLYNEUROPATHY: ICD-10-CM

## 2023-08-09 NOTE — TELEPHONE ENCOUNTER
Caller: Grayson Ortega    Relationship: Self    Best call back number: [phone]     Requested Prescriptions:   Requested Prescriptions     Pending Prescriptions Disp Refills    gabapentin (NEURONTIN) 300 MG capsule 90 capsule 0     Sig: Take 1 capsule by mouth 3 (Three) Times a Day.        Pharmacy where request should be sent: [facility] [id number] [place] [id number] AT [facility]. [address]  - [phone] [facility] [phone] FX     Last office visit with prescribing clinician: 5/12/2023   Last telemedicine visit with prescribing clinician: Visit date not found   Next office visit with prescribing clinician: Visit date not found     Additional details provided by patient:     Does the patient have less than a 3 day supply:  [x] Yes  [] No    Would you like a call back once the refill request has been completed: [] Yes [] No    If the office needs to give you a call back, can they leave a voicemail: [] Yes [] No    Emile Bolaños Rep   08/09/23 12:39 EDT

## 2023-08-11 NOTE — TELEPHONE ENCOUNTER
[facility]- 2/07/2023    [facility]- 2/07/2023      Pt will be calling Monday 8/14 to schedule Establish Care appt, pt is having surgery soon and is hoping this will be the last time he needs this medication refilled. I advised pt that I would send the rx refill request to one of our providers and would let him know if they are ok with refilling it before his appt.

## 2023-08-14 RX ORDER — GABAPENTIN 300 MG/1
300 CAPSULE ORAL 3 TIMES DAILY
Qty: 90 CAPSULE | Refills: 0 | Status: SHIPPED | OUTPATIENT
Start: 2023-08-14

## 2024-03-21 RX ORDER — AMLODIPINE BESYLATE 5 MG/1
5 TABLET ORAL DAILY
Qty: 90 TABLET | Refills: 0 | Status: SHIPPED | OUTPATIENT
Start: 2024-03-21

## 2024-03-21 RX ORDER — ATORVASTATIN CALCIUM 20 MG/1
20 TABLET, FILM COATED ORAL DAILY
Qty: 90 TABLET | Refills: 3 | Status: SHIPPED | OUTPATIENT
Start: 2024-03-21

## 2024-04-10 ENCOUNTER — OFFICE VISIT (OUTPATIENT)
Dept: INTERNAL MEDICINE | Facility: CLINIC | Age: 64
End: 2024-04-10
Payer: MEDICARE

## 2024-04-10 VITALS
SYSTOLIC BLOOD PRESSURE: 148 MMHG | HEIGHT: 70 IN | DIASTOLIC BLOOD PRESSURE: 90 MMHG | OXYGEN SATURATION: 97 % | BODY MASS INDEX: 27.63 KG/M2 | WEIGHT: 193 LBS | HEART RATE: 86 BPM

## 2024-04-10 DIAGNOSIS — Z12.5 PROSTATE CANCER SCREENING: ICD-10-CM

## 2024-04-10 DIAGNOSIS — I10 ESSENTIAL HYPERTENSION: ICD-10-CM

## 2024-04-10 DIAGNOSIS — R68.82 DECREASED LIBIDO: ICD-10-CM

## 2024-04-10 DIAGNOSIS — N52.9 ERECTILE DYSFUNCTION, UNSPECIFIED ERECTILE DYSFUNCTION TYPE: ICD-10-CM

## 2024-04-10 DIAGNOSIS — E78.00 PURE HYPERCHOLESTEROLEMIA: Primary | ICD-10-CM

## 2024-04-10 DIAGNOSIS — R01.1 HEART MURMUR: ICD-10-CM

## 2024-04-10 NOTE — PROGRESS NOTES
Joe May D.O.  Internal Medicine  [facility]  [address], [address]  [place] [address]  [phone]      Chief Complaint  Establish Care (Previous pcp Danuta Garduno NP) and Hypertension    SUBJECTIVE    History of Present Illness    Grayson Ortega is a 64 y.o. male who presents to the office today as a new patient to establish care.     Hypertension: takes amlodipine 5 mg daily , doesn't check BP at home regularly but when he does check it at home it is 120s/70s.     Hyperlipidemia: takes atorvastatin 20 mg daily and takes fish oil over the counter. For primary prevention.     Lumbar DDD, bilateral hip arthritis: follows with orthopedics. S/p total hip replacement on the left. Previously took gabapentin, states he doesn't want to take it unless he really needs to.     Allergies: takes cetirizine and flonase nasal sprays    BPH: s/p HoLEP procedure with [facility]     ED: states he has a problem keeping an erection longer than 15 minutes. No issues obtaining the erection. His interest in sex is not as much as it used to be. Doesn't feel he has decreased ability to do activities.     Allergies   Allergen Reactions    Codeine Nausea And Vomiting    Oxycodone Nausea And Vomiting        Outpatient Medications Marked as Taking for the 4/10/24 encounter (Office Visit) with Joe May, DO   Medication Sig Dispense Refill    amLODIPine (NORVASC) 5 MG tablet TAKE ONE TABLET BY MOUTH DAILY 90 tablet 0    atorvastatin (LIPITOR) 20 MG tablet TAKE ONE TABLET BY MOUTH DAILY 90 tablet 3    cetirizine (zyrTEC) 10 MG tablet       MULTIPLE VITAMIN PO Take  by mouth.      OMEGA-3 FATTY ACIDS PO Take 1 tablet by mouth Daily.      PROBIOTIC PRODUCT PO Take 1 tablet by mouth Daily.          Past Medical History:   Diagnosis Date    Acute UTI (urinary tract infection) 03/07/2019    Allergic     BPH (benign prostatic hypertrophy)     DDD (degenerative disc disease), lumbar     Heart murmur     childhood  "   Hip arthritis     Hyperlipidemia     Hypertension     Inguinal hernia     Kidney stone     Surgery to remove 3/2/2019 with [facility]    Tinnitus      Past Surgical History:   Procedure Laterality Date    INGUINAL HERNIA REPAIR Left 11/18/2016    Procedure: INGUINAL HERNIA REPAIR LAPAROSCOPIC;  Surgeon: Keron Pérez MD;  [place] [facility] [place] [facility] [place]  Service:     OTHER SURGICAL HISTORY      Holmium Laser Enucleation of the Prostate    TOTAL HIP ARTHROPLASTY Left      Family History   Problem Relation Age of Onset    Breast cancer Mother     Hypothyroidism Mother     Uterine cancer Mother     Leukemia Father     Diabetes Half-Sister     Obesity Half-Sister     Hypertension Half-Sister     Rheum arthritis Sister     Hypertension Sister     reports that he has never smoked. He has never used smokeless tobacco. He reports that he does not currently use alcohol after a past usage of about 3.0 - 9.0 standard drinks of alcohol per week. He reports that he does not use drugs.    OBJECTIVE    Vital Signs:   /90   Pulse 86   Ht 177.8 cm (70\")   Wt 87.5 kg (193 lb)   SpO2 97%   BMI 27.69 kg/m²        Physical Exam  Vitals reviewed.   Constitutional:       General: He is not in acute distress.     Appearance: Normal appearance. He is not ill-appearing.   Eyes:      General: No scleral icterus.  Cardiovascular:      Rate and Rhythm: Normal rate and regular rhythm.      Heart sounds: Murmur heard.      Systolic murmur is present with a grade of 3/6.   Pulmonary:      Effort: Pulmonary effort is normal. No respiratory distress.      Breath sounds: Normal breath sounds. No wheezing.   Musculoskeletal:      Right lower leg: No edema.      Left lower leg: No edema.   Skin:     Coloration: Skin is not jaundiced.   Neurological:      Mental Status: He is alert.   Psychiatric:         Mood and Affect: Mood normal.         Behavior: Behavior normal.         Thought Content: Thought content normal.                       "       ASSESSMENT & PLAN     Diagnoses and all orders for this visit:    1. Pure hypercholesterolemia (Primary)   takes atorvastatin 20 mg daily and takes fish oil over the counter. For primary prevention.   Lab Results   Component Value Date    CHLPL 144 05/05/2023    TRIG 72 05/05/2023    HDL 58 05/05/2023    LDL 72 05/05/2023     -great control in 2023  -check CMP and fasting lipid profile to reassess     2. Erectile dysfunction, unspecified erectile dysfunction type  3. Decreased libido  -states he has a problem keeping an erection longer than 15 minutes. No issues obtaining the erection. His interest in sex is not as much as it used to be. Doesn't feel he has decreased ability to do activities.   -symptoms not overly convincing of hypogonadism   - TSH checked 5/2023 and normal  -will check fasting AM testosterone   -if testosterone normal, can discuss sildenafil     4. Prostate cancer screening  -     PSA Screen    5. Essential hypertension  -takes amlodipine 5 mg daily , doesn't check BP at home regularly but when he does check it at home it is 120s/70s.   -BP high at 148/90, but home BP is much better but he doesn't check often. Recommend he increase checks over the next 1-2 months with goal BP less than 130/80 average. Bring log to next visit. Check CMP.    6. Heart murmur  -reports having this since childhood  -3/6 systolic murmur on exam today, best heard left sternal border  -will send for TTE  -     Adult Transthoracic Echo Complete W/ Cont if Necessary Per Protocol            Follow Up  Return in about 3 months (around 7/10/2024) for Medicare Wellness.    Patient/family had no further questions at this time and verbalized understanding of the plan discussed today.

## 2024-04-12 ENCOUNTER — PATIENT ROUNDING (BHMG ONLY) (OUTPATIENT)
Dept: INTERNAL MEDICINE | Facility: CLINIC | Age: 64
End: 2024-04-12
Payer: MEDICARE

## 2024-04-12 NOTE — PROGRESS NOTES
April 12, 2024    Rounded with & welcomed patient during rooming, check in/out.  Patient will follow up at next scheduled office visit.

## 2024-04-13 LAB
ALBUMIN SERPL-MCNC: 4.3 G/DL (ref 3.5–5.2)
ALBUMIN/GLOB SERPL: 2.2 G/DL
ALP SERPL-CCNC: 74 U/L (ref 39–117)
ALT SERPL-CCNC: 31 U/L (ref 1–41)
AST SERPL-CCNC: 25 U/L (ref 1–40)
BASOPHILS # BLD AUTO: 0.03 10*3/MM3 (ref 0–0.2)
BASOPHILS NFR BLD AUTO: 0.6 % (ref 0–1.5)
BILIRUB SERPL-MCNC: 0.8 MG/DL (ref 0–1.2)
BUN SERPL-MCNC: 21 MG/DL (ref 8–23)
BUN/CREAT SERPL: 22.1 (ref 7–25)
CALCIUM SERPL-MCNC: 9.4 MG/DL (ref 8.6–10.5)
CHLORIDE SERPL-SCNC: 105 MMOL/L (ref 98–107)
CHOLEST SERPL-MCNC: 147 MG/DL (ref 0–200)
CO2 SERPL-SCNC: 26.9 MMOL/L (ref 22–29)
CREAT SERPL-MCNC: 0.95 MG/DL (ref 0.76–1.27)
EGFRCR SERPLBLD CKD-EPI 2021: 88.3 ML/MIN/1.73
EOSINOPHIL # BLD AUTO: 0.16 10*3/MM3 (ref 0–0.4)
EOSINOPHIL NFR BLD AUTO: 3.3 % (ref 0.3–6.2)
ERYTHROCYTE [DISTWIDTH] IN BLOOD BY AUTOMATED COUNT: 13 % (ref 12.3–15.4)
GLOBULIN SER CALC-MCNC: 2 GM/DL
GLUCOSE SERPL-MCNC: 82 MG/DL (ref 65–99)
HCT VFR BLD AUTO: 46.4 % (ref 37.5–51)
HDLC SERPL-MCNC: 58 MG/DL (ref 40–60)
HGB BLD-MCNC: 16 G/DL (ref 13–17.7)
IMM GRANULOCYTES # BLD AUTO: 0.02 10*3/MM3 (ref 0–0.05)
IMM GRANULOCYTES NFR BLD AUTO: 0.4 % (ref 0–0.5)
LDLC SERPL CALC-MCNC: 73 MG/DL (ref 0–100)
LYMPHOCYTES # BLD AUTO: 1.58 10*3/MM3 (ref 0.7–3.1)
LYMPHOCYTES NFR BLD AUTO: 32.5 % (ref 19.6–45.3)
MCH RBC QN AUTO: 30.9 PG (ref 26.6–33)
MCHC RBC AUTO-ENTMCNC: 34.5 G/DL (ref 31.5–35.7)
MCV RBC AUTO: 89.6 FL (ref 79–97)
MONOCYTES # BLD AUTO: 0.43 10*3/MM3 (ref 0.1–0.9)
MONOCYTES NFR BLD AUTO: 8.8 % (ref 5–12)
NEUTROPHILS # BLD AUTO: 2.64 10*3/MM3 (ref 1.7–7)
NEUTROPHILS NFR BLD AUTO: 54.4 % (ref 42.7–76)
NRBC BLD AUTO-RTO: 0 /100 WBC (ref 0–0.2)
PLATELET # BLD AUTO: 228 10*3/MM3 (ref 140–450)
POTASSIUM SERPL-SCNC: 4.6 MMOL/L (ref 3.5–5.2)
PROT SERPL-MCNC: 6.3 G/DL (ref 6–8.5)
PSA SERPL-MCNC: 0.32 NG/ML (ref 0–4)
RBC # BLD AUTO: 5.18 10*6/MM3 (ref 4.14–5.8)
SODIUM SERPL-SCNC: 140 MMOL/L (ref 136–145)
TESTOST SERPL-MCNC: 655 NG/DL (ref 264–916)
TRIGL SERPL-MCNC: 86 MG/DL (ref 0–150)
VLDLC SERPL CALC-MCNC: 16 MG/DL (ref 5–40)
WBC # BLD AUTO: 4.86 10*3/MM3 (ref 3.4–10.8)

## 2024-04-15 RX ORDER — SILDENAFIL CITRATE 20 MG/1
TABLET ORAL
Qty: 30 TABLET | Refills: 3 | Status: SHIPPED | OUTPATIENT
Start: 2024-04-15

## 2024-04-19 ENCOUNTER — HOSPITAL ENCOUNTER (OUTPATIENT)
Dept: CARDIOLOGY | Facility: HOSPITAL | Age: 64
Discharge: HOME | End: 2024-04-19
Payer: MEDICARE

## 2024-04-19 VITALS
DIASTOLIC BLOOD PRESSURE: 99 MMHG | HEIGHT: 70 IN | BODY MASS INDEX: 27.63 KG/M2 | SYSTOLIC BLOOD PRESSURE: 180 MMHG | WEIGHT: 193 LBS | HEART RATE: 80 BPM

## 2024-04-19 LAB
AORTIC DIMENSIONLESS INDEX: 0.44 (DI)
BH CV ECHO MEAS - ACS: 1.71 CM
BH CV ECHO MEAS - AI P1/2T: 834.5 MSEC
BH CV ECHO MEAS - AO MAX PG: 22 MMHG
BH CV ECHO MEAS - AO MEAN PG: 15 MMHG
BH CV ECHO MEAS - AO ROOT DIAM: 3.3 CM
BH CV ECHO MEAS - AO V2 MAX: 234.6 CM/SEC
BH CV ECHO MEAS - AO V2 VTI: 50.8 CM
BH CV ECHO MEAS - AVA(I,D): 1.43 CM2
BH CV ECHO MEAS - EDV(CUBED): 84.7 ML
BH CV ECHO MEAS - EDV(MOD-SP2): 79 ML
BH CV ECHO MEAS - EDV(MOD-SP4): 89 ML
BH CV ECHO MEAS - EF(MOD-BP): 60.7 %
BH CV ECHO MEAS - EF(MOD-SP2): 60.8 %
BH CV ECHO MEAS - EF(MOD-SP4): 62.9 %
BH CV ECHO MEAS - ESV(MOD-SP2): 31 ML
BH CV ECHO MEAS - ESV(MOD-SP4): 33 ML
BH CV ECHO MEAS - FS: 31.3 %
BH CV ECHO MEAS - IVS/LVPW: 1.08 CM
BH CV ECHO MEAS - IVSD: 0.86 CM
BH CV ECHO MEAS - LAT PEAK E' VEL: 13.1 CM/SEC
BH CV ECHO MEAS - LV DIASTOLIC VOL/BSA (35-75): 43.3 CM2
BH CV ECHO MEAS - LV MASS(C)D: 115.1 GRAMS
BH CV ECHO MEAS - LV MAX PG: 6.3 MMHG
BH CV ECHO MEAS - LV MEAN PG: 3.1 MMHG
BH CV ECHO MEAS - LV SYSTOLIC VOL/BSA (12-30): 16.1 CM2
BH CV ECHO MEAS - LV V1 MAX: 125.8 CM/SEC
BH CV ECHO MEAS - LV V1 VTI: 22.3 CM
BH CV ECHO MEAS - LVIDD: 4.4 CM
BH CV ECHO MEAS - LVIDS: 3 CM
BH CV ECHO MEAS - LVOT AREA: 3.2 CM2
BH CV ECHO MEAS - LVOT DIAM: 2.03 CM
BH CV ECHO MEAS - LVPWD: 0.8 CM
BH CV ECHO MEAS - MED PEAK E' VEL: 10.8 CM/SEC
BH CV ECHO MEAS - MR MAX PG: 149.7 MMHG
BH CV ECHO MEAS - MR MAX VEL: 611.7 CM/SEC
BH CV ECHO MEAS - MV A DUR: 0.12 SEC
BH CV ECHO MEAS - MV A MAX VEL: 83.6 CM/SEC
BH CV ECHO MEAS - MV DEC SLOPE: 789.2 CM/SEC2
BH CV ECHO MEAS - MV DEC TIME: 0.16 SEC
BH CV ECHO MEAS - MV E MAX VEL: 81.8 CM/SEC
BH CV ECHO MEAS - MV E/A: 0.98
BH CV ECHO MEAS - MV MAX PG: 3 MMHG
BH CV ECHO MEAS - MV MEAN PG: 1.41 MMHG
BH CV ECHO MEAS - MV P1/2T: 34 MSEC
BH CV ECHO MEAS - MV V2 VTI: 21 CM
BH CV ECHO MEAS - MVA(P1/2T): 6.5 CM2
BH CV ECHO MEAS - MVA(VTI): 3.4 CM2
BH CV ECHO MEAS - PA V2 MAX: 89.6 CM/SEC
BH CV ECHO MEAS - PULM A REVS DUR: 0.08 SEC
BH CV ECHO MEAS - PULM A REVS VEL: 34.3 CM/SEC
BH CV ECHO MEAS - PULM DIAS VEL: 30.4 CM/SEC
BH CV ECHO MEAS - PULM S/D: 1.04
BH CV ECHO MEAS - PULM SYS VEL: 31.7 CM/SEC
BH CV ECHO MEAS - RAP SYSTOLE: 3 MMHG
BH CV ECHO MEAS - RV MAX PG: 1.34 MMHG
BH CV ECHO MEAS - RV V1 MAX: 57.8 CM/SEC
BH CV ECHO MEAS - RV V1 VTI: 11 CM
BH CV ECHO MEAS - RVSP: 33 MMHG
BH CV ECHO MEAS - SI(MOD-SP2): 23.3 ML/M2
BH CV ECHO MEAS - SI(MOD-SP4): 27.2 ML/M2
BH CV ECHO MEAS - SV(LVOT): 72.4 ML
BH CV ECHO MEAS - SV(MOD-SP2): 48 ML
BH CV ECHO MEAS - SV(MOD-SP4): 56 ML
BH CV ECHO MEAS - TR MAX PG: 30 MMHG
BH CV ECHO MEAS - TR MAX VEL: 274 CM/SEC
BH CV ECHO MEASUREMENTS AVERAGE E/E' RATIO: 6.85
BH CV XLRA - RV BASE: 3.6 CM
BH CV XLRA - RV LENGTH: 6.6 CM
BH CV XLRA - RV MID: 2.09 CM
BH CV XLRA - TDI S': 10.6 CM/SEC
LEFT ATRIUM VOLUME INDEX: 29.2 ML/M2
SINUS: 3 CM
STJ: 2.7 CM

## 2024-04-19 PROCEDURE — 93306 TTE W/DOPPLER COMPLETE: CPT

## 2024-05-10 ENCOUNTER — OFFICE VISIT (OUTPATIENT)
Dept: CARDIOLOGY | Facility: CLINIC | Age: 64
End: 2024-05-10
Payer: MEDICARE

## 2024-05-10 VITALS
DIASTOLIC BLOOD PRESSURE: 98 MMHG | HEIGHT: 70 IN | HEART RATE: 96 BPM | BODY MASS INDEX: 27.32 KG/M2 | SYSTOLIC BLOOD PRESSURE: 180 MMHG | OXYGEN SATURATION: 98 % | WEIGHT: 190.8 LBS

## 2024-05-10 DIAGNOSIS — I10 BENIGN ESSENTIAL HYPERTENSION: ICD-10-CM

## 2024-05-10 DIAGNOSIS — I35.0 AORTIC STENOSIS, MILD: ICD-10-CM

## 2024-05-10 DIAGNOSIS — E78.00 HYPERCHOLESTEROLEMIA: Primary | ICD-10-CM

## 2024-05-10 PROCEDURE — 99214 OFFICE O/P EST MOD 30 MIN: CPT | Performed by: INTERNAL MEDICINE

## 2024-05-10 PROCEDURE — 3077F SYST BP >= 140 MM HG: CPT | Performed by: INTERNAL MEDICINE

## 2024-05-10 PROCEDURE — 3080F DIAST BP >= 90 MM HG: CPT | Performed by: INTERNAL MEDICINE

## 2024-05-10 RX ORDER — NEBIVOLOL 2.5 MG/1
2.5 TABLET ORAL DAILY
Qty: 30 TABLET | Refills: 3 | Status: SHIPPED | OUTPATIENT
Start: 2024-05-10

## 2024-06-28 RX ORDER — AMLODIPINE BESYLATE 5 MG/1
5 TABLET ORAL DAILY
Qty: 90 TABLET | Refills: 3 | Status: SHIPPED | OUTPATIENT
Start: 2024-06-28

## 2024-07-12 ENCOUNTER — OFFICE VISIT (OUTPATIENT)
Dept: INTERNAL MEDICINE | Facility: CLINIC | Age: 64
End: 2024-07-12
Payer: MEDICARE

## 2024-07-12 VITALS
HEART RATE: 67 BPM | DIASTOLIC BLOOD PRESSURE: 98 MMHG | OXYGEN SATURATION: 97 % | WEIGHT: 192 LBS | SYSTOLIC BLOOD PRESSURE: 160 MMHG | HEIGHT: 70 IN | BODY MASS INDEX: 27.49 KG/M2

## 2024-07-12 DIAGNOSIS — Z00.00 WELCOME TO MEDICARE PREVENTIVE VISIT: Primary | ICD-10-CM

## 2024-07-12 DIAGNOSIS — M51.36 DDD (DEGENERATIVE DISC DISEASE), LUMBAR: ICD-10-CM

## 2024-07-12 DIAGNOSIS — Z00.00 ANNUAL PHYSICAL EXAM: ICD-10-CM

## 2024-07-12 DIAGNOSIS — M16.0 BILATERAL HIP JOINT ARTHRITIS: ICD-10-CM

## 2024-07-12 RX ORDER — CYCLOBENZAPRINE HCL 10 MG
TABLET ORAL
Qty: 30 TABLET | Refills: 1 | Status: SHIPPED | OUTPATIENT
Start: 2024-07-12

## 2024-07-12 NOTE — PROGRESS NOTES
"The ABCs of the Annual Wellness Visit  [place] to Medicare Visit    Subjective     Grayson Ortega is a 64 y.o. male who presents for a  Welcome to Medicare Visit.    The following portions of the patient's history were reviewed and   updated as appropriate: allergies, current medications, past family history, past medical history, past social history, past surgical history, and problem list.     Hypertension: takes amlodipine 5 mg daily and most recently cardiology started nebivolol 2.5 mg daily.States his home readings are a lot different than office.  Home BP log below :        Hyperlipidemia: takes atorvastatin 20 mg daily and takes fish oil over the counter. For primary prevention.   Lab Results   Component Value Date    CHLPL 147 04/12/2024    TRIG 86 04/12/2024    HDL 58 04/12/2024    LDL 73 04/12/2024        Lumbar DDD, bilateral hip arthritis: follows with orthopedics. S/p total hip replacement on the left. Previously took gabapentin, states he doesn't want to take it unless he really needs to. States his stretch therapist feels he is very tight in the \"the whole body\", especially in the back and hips and has suggested a muscle relaxer. He does stretch therapy 2 times weekly.      Allergies: takes cetirizine and flonase nasal sprays     BPH: s/p HoLEP procedure with [facility]      ED: at last visit started sildenafil , he takes 20-40 mg as needed with improvement.     Mild aortic valve stenosis per echo in April 2024.  Bicuspid aortic valve suggested: sees Dr Garces  [facility].     Compared to one year ago, the patient feels his physical   health is better.    Compared to one year ago, the patient feels his mental   health is the same.    Recent Hospitalizations:  He was not admitted to the hospital during the last year.       Current Medical Providers:  Patient Care Team:  Joe May DO as PCP - General (Internal Medicine)  Frederic Garces MD as Consulting Physician " "(Cardiology)  Tj Calderón MD as Surgeon (Orthopedic Surgery)    Outpatient Medications Prior to Visit   Medication Sig Dispense Refill    amLODIPine (NORVASC) 5 MG tablet Take 1 tablet by mouth Daily. 90 tablet 3    atorvastatin (LIPITOR) 20 MG tablet TAKE ONE TABLET BY MOUTH DAILY 90 tablet 3    cetirizine (zyrTEC) 10 MG tablet       MULTIPLE VITAMIN PO Take  by mouth.      nebivolol (BYSTOLIC) 2.5 MG tablet Take 1 tablet by mouth Daily. 30 tablet 3    OMEGA-3 FATTY ACIDS PO Take 1 tablet by mouth Daily.      PROBIOTIC PRODUCT PO Take 1 tablet by mouth Daily.      sildenafil (REVATIO) 20 MG tablet Take one to five tablets once daily as needed 1-4 hours before sexual activity. Do not take more than five tablets in a 24 hour period. 30 tablet 3     No facility-administered medications prior to visit.       No opioid medication identified on active medication list. I have reviewed chart for other potential  high risk medication/s and harmful drug interactions in the elderly.        Aspirin is not on active medication list.  Aspirin use is not indicated based on review of current medical condition/s. Risk of harm outweighs potential benefits.  .    Patient Active Problem List   Diagnosis    Benign essential hypertension    Hypercholesterolemia    Elevated PSA    Arthritis pain of hip    Annual physical exam    Aortic stenosis, mild     Advance Care Planning   Advance Care Planning     Advance Directive is not on file.  ACP discussion was held with the patient during this visit. Patient has an advance directive (not in EMR), copy requested.       Objective   Vitals:    07/12/24 1549   BP: 160/98   Pulse: 67   SpO2: 97%   Weight: 87.1 kg (192 lb)   Height: 177.8 cm (70\")     Physical Exam  Vitals reviewed.   Constitutional:       General: He is not in acute distress.     Appearance: Normal appearance. He is not ill-appearing.   HENT:      Head: Normocephalic and atraumatic.      Right Ear: Tympanic " "membrane, ear canal and external ear normal. There is no impacted cerumen.      Left Ear: Tympanic membrane, ear canal and external ear normal. There is no impacted cerumen.      Mouth/Throat:      Mouth: Mucous membranes are moist.      Pharynx: No oropharyngeal exudate or posterior oropharyngeal erythema.   Eyes:      General: No scleral icterus.     Extraocular Movements: Extraocular movements intact.      Conjunctiva/sclera: Conjunctivae normal.      Pupils: Pupils are equal, round, and reactive to light.   Cardiovascular:      Rate and Rhythm: Normal rate and regular rhythm.      Heart sounds: Normal heart sounds. Murmur heard.      Systolic murmur is present with a grade of 2/6.   Pulmonary:      Effort: Pulmonary effort is normal. No respiratory distress.      Breath sounds: Normal breath sounds. No wheezing.   Abdominal:      General: Bowel sounds are normal. There is no distension.      Palpations: Abdomen is soft.      Tenderness: There is no abdominal tenderness. There is no guarding.   Musculoskeletal:      Cervical back: Neck supple.      Right lower leg: No edema.      Left lower leg: No edema.   Lymphadenopathy:      Cervical: No cervical adenopathy.   Skin:     General: Skin is warm and dry.      Coloration: Skin is not jaundiced.   Neurological:      General: No focal deficit present.      Mental Status: He is alert and oriented to person, place, and time.      Cranial Nerves: No cranial nerve deficit.      Motor: No weakness.      Comments: Normal gait/balance    Psychiatric:         Mood and Affect: Mood normal.         Behavior: Behavior normal.         Thought Content: Thought content normal.           Estimated body mass index is 27.55 kg/m² as calculated from the following:    Height as of this encounter: 177.8 cm (70\").    Weight as of this encounter: 87.1 kg (192 lb).    BMI is >= 25 and <30. (Overweight) The following options were offered after discussion;: exercise " counseling/recommendations      Does the patient have evidence of cognitive impairment?   No              HEALTH RISK ASSESSMENT    Smoking Status:  Social History     Tobacco Use   Smoking Status Never   Smokeless Tobacco Never     Alcohol Consumption:  Social History     Substance and Sexual Activity   Alcohol Use Yes    Alcohol/week: 3.0 - 9.0 standard drinks of alcohol    Types: 3 - 9 Drinks containing 0.5 oz of alcohol per week       Fall Risk Screen:    BHARGAVGOLDIE Fall Risk Assessment was completed, and patient is at LOW risk for falls.Assessment completed on:2024    Depression Screen:       2024     3:50 PM   PHQ-2/PHQ-9 Depression Screening   Little Interest or Pleasure in Doing Things 0-->not at all   Feeling Down, Depressed or Hopeless 0-->not at all   PHQ-9: Brief Depression Severity Measure Score 0       Health Habits and Functional and Cognitive Screenin/5/2024     7:35 PM   Functional & Cognitive Status   Do you have difficulty preparing food and eating? No   Do you have difficulty bathing yourself, getting dressed or grooming yourself? No   Do you have difficulty using the toilet? No   Do you have difficulty moving around from place to place? No   Do you have trouble with steps or getting out of a bed or a chair? No   Current Diet Well Balanced Diet   Dental Exam Up to date   Eye Exam Not up to date   Exercise (times per week) 6 times per week   Current Exercises Include Aerobics;Cardiovascular Workout;Gardening;Home Fitness Gym;Stationary Bicycling/Spin Class;Stair Step Machine;Weightlifting;Yard Work   Do you need help using the phone?  No   Are you deaf or do you have serious difficulty hearing?  No   Do you need help to go to places out of walking distance? No   Do you need help shopping? No   Do you need help preparing meals?  No   Do you need help with housework?  No   Do you need help with laundry? No   Do you need help taking your medications? No   Do you need help managing  money? No   Do you ever drive or ride in a car without wearing a seat belt? No   Have you felt unusual stress, anger or loneliness in the last month? No   Who do you live with? Spouse   If you need help, do you have trouble finding someone available to you? No   Have you been bothered in the last four weeks by sexual problems? No   Do you have difficulty concentrating, remembering or making decisions? No       Visual Acuity:    Vision Screening    Right eye Left eye Both eyes   Without correction 20/40 20/50 20/25   With correction          Age-appropriate Screening Schedule:  Refer to the list below for future screening recommendations based on patient's age, sex and/or medical conditions. Orders for these recommended tests are listed in the plan section. The patient has been provided with a written plan.    Health Maintenance   Topic Date Due    BMI FOLLOWUP  Never done    ZOSTER VACCINE (1 of 2) Never done    Pneumococcal Vaccine 65+ (1 of 1 - PCV) Never done    COVID-19 Vaccine (1 - 2023-24 season) Never done    INFLUENZA VACCINE  08/01/2024    LIPID PANEL  04/12/2025    ANNUAL WELLNESS VISIT  07/12/2025    COLORECTAL CANCER SCREENING  12/27/2029    TDAP/TD VACCINES (2 - Td or Tdap) 05/12/2033    HEPATITIS C SCREENING  Addressed        CMS Preventative Services Quick Reference  Risk Factors Identified During Encounter    Immunizations Discussed/Encouraged: Influenza, Prevnar 20 (Pneumococcal 20-valent conjugate), Shingrix, and COVID19. Pt states he will never take flu or COVID shot but is open to Prevnar today.  Dental Screening Recommended  Vision Screening Recommended    The above risks/problems have been discussed with the patient.  Pertinent information has been shared with the patient in the After Visit Summary.  Follow up plans and orders are seen below in the Assessment/Plan Section.      Follow Up:   Initial Medicare Visit in one year    An After Visit Summary and PPPS were made available to the  "patient.    A problem-based visit was also conducted on the same day, see below for assessment and plan    Diagnoses and all orders for this visit:    1. DDD (degenerative disc disease), lumbar (Primary)  2. Bilateral hip joint arthritis  -follows with orthopedics. S/p total hip replacement on the left. Previously took gabapentin, states he doesn't want to take it unless he really needs to. States his stretch therapist feels he is very tight in the \"the whole body\", especially in the back and hips and has suggested a muscle relaxer. He does stretch therapy 2 times weekly.   -We discussed a trial of cyclobenzaprine 5 to 10 mg daily as needed.  I advised him this can help relax muscles and improve his ability to stretch and potentially improve muscle tightness but they are not indicated for long-term and frequent use.  Ideally if they do not make him sleepy he can take 1 before the stretching classes but if he is sedate from them he would only need to take them at night or when he is planning to be home.        The following social determinates of health impact the patient's medical decision making: No social determinates of health were factored in to today's visit.     Follow Up  Return in about 6 months (around 1/12/2025) for Recheck.    Patient's annual Complete Physical Exam was also completed on this date:   -Updated and reviewed past medical, family, social and surgical histories as well as allergies. Addressed care gaps listed in the medical record. Reviewed and updated medication list.   -Encouraged seat belt use for every car ride for patient and all occupants.  Encouraged sunscreen use to reduce risk of skin cancer for any days with sun exposure over 20 minutes. Discussed the importance of smoke and carbon monoxide detectors in the home.   -Encouraged minimum of 30 minutes or more of exercise at a brisk walk or higher 5 days per week.  -Immunizations reviewed and updated in EMR.  -Physical exam findings " documented above  Other Pertinent Preventative Topics Reviewed:   -Lipid screening:  Patient is already on statin therapy.   -Aspirin for primary or secondary prevention: Not applicable, patient is greater than age 60 and risks outweigh benefits for primary prevention.  -Diabetes screening:  Screening not indicated at this time.   -Abdominal aortic aneurysm screening: AAA screening ultrasound is not indicated as patient has never smoked.  -Hypertension screening: Patient with known diagnosis of hypertension and is receiving treatment.  -HIV screening: Patient is over age 65, screening not indicated.   -Syphilis screening: Syphilis screening not indicated.  -Hepatitis B virus screening: Screening not indicated, not in a high-risk group.  -Hepatitis C virus screening:  pt has declined   -Colon cancer screening: pt has never had a colonoscopy , states he has declined in the past and has no concern about colon cancer after hearing risks and benefits and different types of screening available.   -Lung cancer screening: Patient has never smoked.  -Prostate cancer screening: PSA up to date  Lab Results   Component Value Date    PSA 0.321 04/12/2024    PSA 4.140 (H) 11/03/2017

## 2024-09-03 RX ORDER — NEBIVOLOL 2.5 MG/1
2.5 TABLET ORAL DAILY
Qty: 30 TABLET | Refills: 3 | Status: SHIPPED | OUTPATIENT
Start: 2024-09-03

## 2024-10-15 RX ORDER — NEBIVOLOL 2.5 MG/1
2.5 TABLET ORAL DAILY
Qty: 30 TABLET | Refills: 3 | Status: SHIPPED | OUTPATIENT
Start: 2024-10-15

## 2025-01-17 ENCOUNTER — OFFICE VISIT (OUTPATIENT)
Dept: INTERNAL MEDICINE | Facility: CLINIC | Age: 65
End: 2025-01-17
Payer: MEDICARE

## 2025-01-17 VITALS
SYSTOLIC BLOOD PRESSURE: 154 MMHG | WEIGHT: 197.2 LBS | OXYGEN SATURATION: 98 % | BODY MASS INDEX: 28.23 KG/M2 | HEART RATE: 81 BPM | HEIGHT: 70 IN | DIASTOLIC BLOOD PRESSURE: 82 MMHG | TEMPERATURE: 98.1 F

## 2025-01-17 DIAGNOSIS — I10 ESSENTIAL HYPERTENSION: Primary | ICD-10-CM

## 2025-01-17 PROCEDURE — 3077F SYST BP >= 140 MM HG: CPT | Performed by: STUDENT IN AN ORGANIZED HEALTH CARE EDUCATION/TRAINING PROGRAM

## 2025-01-17 PROCEDURE — 1125F AMNT PAIN NOTED PAIN PRSNT: CPT | Performed by: STUDENT IN AN ORGANIZED HEALTH CARE EDUCATION/TRAINING PROGRAM

## 2025-01-17 PROCEDURE — 3079F DIAST BP 80-89 MM HG: CPT | Performed by: STUDENT IN AN ORGANIZED HEALTH CARE EDUCATION/TRAINING PROGRAM

## 2025-01-17 PROCEDURE — 99214 OFFICE O/P EST MOD 30 MIN: CPT | Performed by: STUDENT IN AN ORGANIZED HEALTH CARE EDUCATION/TRAINING PROGRAM

## 2025-01-17 PROCEDURE — G2211 COMPLEX E/M VISIT ADD ON: HCPCS | Performed by: STUDENT IN AN ORGANIZED HEALTH CARE EDUCATION/TRAINING PROGRAM

## 2025-01-17 NOTE — PROGRESS NOTES
"    Chief Complaint  Hypertension    SUBJECTIVE    History of Present Illness    Grayson Ortega is a 64 y.o. male who presents to the office today as an established patient that last saw me on 7/12/2024.     Hypertension: takes amlodipine 5 mg daily and nebivolol 2.5 mg daily. Checks BP once or twice monthly and usually in low 130s/70s. He has really increased his exercise to 5-6 days per week and sometimes twice daily with biking, nordic trac and free weights.     Allergies   Allergen Reactions    Codeine Nausea And Vomiting    Oxycodone Nausea And Vomiting        Outpatient Medications Marked as Taking for the 1/17/25 encounter (Office Visit) with Joe May, DO   Medication Sig Dispense Refill    amLODIPine (NORVASC) 5 MG tablet Take 1 tablet by mouth Daily. 90 tablet 3    atorvastatin (LIPITOR) 20 MG tablet TAKE ONE TABLET BY MOUTH DAILY 90 tablet 3    cetirizine (zyrTEC) 10 MG tablet       cyclobenzaprine (FLEXERIL) 10 MG tablet Take one half to one tablet daily as needed. 30 tablet 1    MULTIPLE VITAMIN PO Take  by mouth.      nebivolol (BYSTOLIC) 2.5 MG tablet Take 1 tablet by mouth Daily. 30 tablet 3    OMEGA-3 FATTY ACIDS PO Take 1 tablet by mouth Daily.      PROBIOTIC PRODUCT PO Take 1 tablet by mouth Daily.      sildenafil (REVATIO) 20 MG tablet Take one to five tablets once daily as needed 1-4 hours before sexual activity. Do not take more than five tablets in a 24 hour period. 30 tablet 3        Past Medical History:   Diagnosis Date    Acute UTI (urinary tract infection) 03/07/2019    Allergic     Aortic stenosis     BPH (benign prostatic hypertrophy)     DDD (degenerative disc disease), lumbar     Heart murmur     childhood    Hip arthritis     Hyperlipidemia     Hypertension     Inguinal hernia     Kidney stone     Surgery to remove 3/2/2019 with [facility]    Tinnitus        OBJECTIVE    Vital Signs:   /82   Pulse 81   Temp 98.1 °F (36.7 °C) (Infrared)   Ht 177.8 cm (70\")   Wt 89.4 kg (197 " lb 3.2 oz)   SpO2 98%   BMI 28.30 kg/m²        Physical Exam  Vitals reviewed.   Constitutional:       General: He is not in acute distress.     Appearance: Normal appearance. He is not ill-appearing.   Eyes:      General: No scleral icterus.  Cardiovascular:      Rate and Rhythm: Normal rate and regular rhythm.      Heart sounds: Murmur heard.      Systolic murmur is present with a grade of 2/6.   Pulmonary:      Effort: Pulmonary effort is normal. No respiratory distress.      Breath sounds: Normal breath sounds. No wheezing.   Musculoskeletal:      Right lower leg: No edema.      Left lower leg: No edema.   Neurological:      Mental Status: He is alert.   Psychiatric:         Mood and Affect: Mood normal.         Behavior: Behavior normal.         Thought Content: Thought content normal.                             ASSESSMENT & PLAN     Diagnoses and all orders for this visit:    1. Essential hypertension (Primary)  -chronic, uncontrolled in office at 154/82 today  - takes amlodipine 5 mg daily and nebivolol 2.5 mg daily. Checks BP once or twice monthly and usually in low 130s/70s. He has really increased his exercise to 5-6 days per week and sometimes twice daily with biking, nordic trac and free weights.   -BP seems slightly better at home. Recommend he increase BP checks at home to 2-3 times weekly and contact our office if average above 130 systolic or 80 diastolic. He has increased exercise which is great. Check BMP today.          Follow Up  Return in about 6 months (around 7/17/2025) for Medicare Wellness.    Patient/family had no further questions at this time and verbalized understanding of the plan discussed today.

## 2025-01-19 LAB
BUN SERPL-MCNC: 27 MG/DL (ref 8–27)
BUN/CREAT SERPL: 27 (ref 10–24)
CALCIUM SERPL-MCNC: 10.2 MG/DL (ref 8.6–10.2)
CHLORIDE SERPL-SCNC: 103 MMOL/L (ref 96–106)
CO2 SERPL-SCNC: 24 MMOL/L (ref 20–29)
CREAT SERPL-MCNC: 1.01 MG/DL (ref 0.76–1.27)
EGFRCR SERPLBLD CKD-EPI 2021: 82 ML/MIN/1.73
GLUCOSE SERPL-MCNC: 88 MG/DL (ref 70–99)
POTASSIUM SERPL-SCNC: 4.6 MMOL/L (ref 3.5–5.2)
SODIUM SERPL-SCNC: 142 MMOL/L (ref 134–144)

## 2025-03-05 RX ORDER — NEBIVOLOL 2.5 MG/1
2.5 TABLET ORAL DAILY
Qty: 30 TABLET | Refills: 3 | Status: SHIPPED | OUTPATIENT
Start: 2025-03-05

## 2025-03-05 NOTE — TELEPHONE ENCOUNTER
Protocol Failed.     NOV 5/20/2025 ()    LOV 5/10/2024 ()    Assessment/Plan:      Mild aortic valve stenosis per echo in April 2024.  Bicuspid aortic valve suggested  History of EKG abnormality--left axis deviation noted without any other significant abnormalities.  Essential hypertension-  Hypercholesterolemia on statin-excellent lipid panel.  Knee osteoarthritis     Significantly elevated blood pressure.  I have added nebivolol 2.5 mg p.o. in the morning.  He will call back with BP logs in 2 weeks.  Otherwise continue current care.  Follow-up in 1 year or sooner with any concerning symptoms.  Diagnosis and plan of care discussed with patient and verbalized understanding.

## 2025-03-24 DIAGNOSIS — M16.0 BILATERAL HIP JOINT ARTHRITIS: ICD-10-CM

## 2025-03-24 DIAGNOSIS — M51.369 DDD (DEGENERATIVE DISC DISEASE), LUMBAR: ICD-10-CM

## 2025-03-25 RX ORDER — CYCLOBENZAPRINE HCL 10 MG
TABLET ORAL
Qty: 30 TABLET | Refills: 1 | Status: SHIPPED | OUTPATIENT
Start: 2025-03-25

## 2025-04-03 RX ORDER — ATENOLOL 50 MG/1
50 TABLET ORAL DAILY
Qty: 90 TABLET | Refills: 3 | Status: SHIPPED | OUTPATIENT
Start: 2025-04-03

## 2025-05-05 RX ORDER — SILDENAFIL CITRATE 20 MG/1
TABLET ORAL
Qty: 30 TABLET | Refills: 3 | Status: SHIPPED | OUTPATIENT
Start: 2025-05-05

## 2025-05-16 NOTE — PROGRESS NOTES
PCP:  Joe May DO                                                                         ROOM:____________    : 1960  AGE: 65 y.o.    ALLERGIES:Codeine and Oxycodone    LAST OV:______________________ LAST EKG:_______________ LAST WEIGHT:   Wt Readings from Last 1 Encounters:   25 89.4 kg (197 lb 3.2 oz)        BP Readings from Last 3 Encounters:   25 154/82   24 160/98   05/10/24 180/98        WT: ____________  BP: __________ HR ______   02% _______      CHEST PAIN: YES OR NO                                           LIGHTHEADED: YES OR NO    SOA: YES OR NO                    FATIGUE: YES OR NO    PALPITATIONS: YES OR NO                                      EDEMA: YES OR NO    SLEEP APNEA: YES OR NO                                     CPAP     OR    BIPAP                                                  SMOKING:   Social History     Socioeconomic History    Marital status:    Tobacco Use    Smoking status: Never    Smokeless tobacco: Never   Vaping Use    Vaping status: Never Used   Substance and Sexual Activity    Alcohol use: Yes     Alcohol/week: 3.0 - 9.0 standard drinks of alcohol     Types: 3 - 9 Drinks containing 0.5 oz of alcohol per week    Drug use: Never    Sexual activity: Yes     Partners: Female     Birth control/protection: Post-menopausal

## 2025-05-20 ENCOUNTER — OFFICE VISIT (OUTPATIENT)
Dept: CARDIOLOGY | Age: 65
End: 2025-05-20
Payer: MEDICARE

## 2025-05-20 VITALS
WEIGHT: 195.4 LBS | BODY MASS INDEX: 27.97 KG/M2 | HEIGHT: 70 IN | OXYGEN SATURATION: 93 % | HEART RATE: 63 BPM | DIASTOLIC BLOOD PRESSURE: 72 MMHG | SYSTOLIC BLOOD PRESSURE: 114 MMHG

## 2025-05-20 DIAGNOSIS — E78.00 HYPERCHOLESTEROLEMIA: ICD-10-CM

## 2025-05-20 DIAGNOSIS — I10 BENIGN ESSENTIAL HYPERTENSION: ICD-10-CM

## 2025-05-20 DIAGNOSIS — I35.0 AORTIC STENOSIS, MILD: Primary | ICD-10-CM

## 2025-05-20 PROCEDURE — 3074F SYST BP LT 130 MM HG: CPT | Performed by: INTERNAL MEDICINE

## 2025-05-20 PROCEDURE — 3078F DIAST BP <80 MM HG: CPT | Performed by: INTERNAL MEDICINE

## 2025-05-20 PROCEDURE — 99214 OFFICE O/P EST MOD 30 MIN: CPT | Performed by: INTERNAL MEDICINE

## 2025-05-20 NOTE — PROGRESS NOTES
PATIENTINFORMATION    Date of Office Visit: 2025  Encounter Provider: rFederic Garcse MD  Place of Service: [facility] CARDIOLOGY  Patient Name: Grayson Ortega  : 1960    Subjective:     Encounter Date:2025      Patient ID: Grayson Ortega is a 65 y.o. male.    Chief Complaint   Patient presents with    Hypercholesterolemia       HPI  Mr. Ortega is a pleasant 65 years old gentleman who came to cardiology clinic for follow-up visit.  He is compliant with current medications and denies any significant side effects and denies any recent ER visits or hospitalization.  He denies any rest or exertional chest pain, shortness of breath, orthopnea, PND, palpitations, presyncope or syncope or extremity swelling.   Blood pressure runs normal during home monitoring.  Exercise regularly without limiting symptoms    ROS  All systems reviewed and negative except as noted in HPI.    Past Medical History:   Diagnosis Date    Acute UTI (urinary tract infection) 2019    Allergic     Aortic stenosis     BPH (benign prostatic hypertrophy)     DDD (degenerative disc disease), lumbar     Heart murmur     childhood    Hip arthritis     Hyperlipidemia     Hypertension     Inguinal hernia     Kidney stone     Surgery to remove 3/2/2019 with [facility]    Tinnitus        Past Surgical History:   Procedure Laterality Date    INGUINAL HERNIA REPAIR Left 2016    Procedure: INGUINAL HERNIA REPAIR LAPAROSCOPIC;  Surgeon: Keron Pérez MD;  Location: [facility] OR [facility] [place];  Service:     OTHER SURGICAL HISTORY      Holmium Laser Enucleation of the Prostate    TOTAL HIP ARTHROPLASTY Left        Social History     Socioeconomic History    Marital status:    Tobacco Use    Smoking status: Never    Smokeless tobacco: Never   Vaping Use    Vaping status: Never Used   Substance and Sexual Activity    Alcohol use: Yes     Alcohol/week: 3.0 - 9.0 standard drinks of alcohol     Types: 3 - 9 Drinks  "containing 0.5 oz of alcohol per week    Drug use: Never    Sexual activity: Yes     Partners: Female     Birth control/protection: Post-menopausal       Family History   Problem Relation Age of Onset    Breast cancer Mother     Hypothyroidism Mother     Uterine cancer Mother     Leukemia Father     Diabetes Half-Sister     Obesity Half-Sister     Hypertension Half-Sister     Rheum arthritis Sister     Hypertension Sister          Procedures       Objective:     /72 (BP Location: Left arm, Patient Position: Sitting, Cuff Size: Adult)   Pulse 63   Ht 177.8 cm (70\")   Wt 88.6 kg (195 lb 6.4 oz)   SpO2 93%   BMI 28.04 kg/m²  Body mass index is 28.04 kg/m².     Constitutional:       General: Not in acute distress.     Appearance: Well-developed. Not diaphoretic.   Eyes:      Pupils: Pupils are equal, round, and reactive to light.   HENT:      Head: Normocephalic and atraumatic.   Neck:      Thyroid: No thyromegaly.   Pulmonary:      Effort: Pulmonary effort is normal. No respiratory distress.      Breath sounds: Normal breath sounds. No wheezing. No rales.   Chest:      Chest wall: Not tender to palpatation.   Cardiovascular:      Normal rate. Regular rhythm.      Murmurs: There is a harsh midsystolic murmur at the URSB, radiating to the neck.      No gallop.    Pulses:     Intact distal pulses.   Edema:     Peripheral edema absent.   Abdominal:      General: Bowel sounds are normal. There is no distension.      Palpations: Abdomen is soft.      Tenderness: There is no guarding.   Musculoskeletal: Normal range of motion.         General: No deformity.      Cervical back: Normal range of motion and neck supple. Skin:     General: Skin is warm and dry.      Findings: No rash.   Neurological:      Mental Status: Alert and oriented to person, place, and time.      Cranial Nerves: No cranial nerve deficit.      Deep Tendon Reflexes: Reflexes are normal and symmetric.   Psychiatric:         Judgment: Judgment " normal.         Review Of Data: I have reviewed pertinent recent labs, images and documents and pertinent findings included in HPI or assessment below.    Assessment/Plan:      Mild aortic valve stenosis per echo in April 2024.  Bicuspid aortic valve suggested.   History of EKG abnormality--left axis deviation noted without any other significant abnormalities.  Essential hypertension-on atenolol and amlodipine  Hypercholesterolemia on statin-excellent lipid panel on statin    Excellent blood pressure control.  Mild aortic valve stenosis-murmur viwjghuww-bundev-ar echo in 1 year ( in 2026)  Continue current care    He is relocating to [place] and needs follow-up echocardiogram periodically and fu with cardiologist     Diagnosis and plan of care discussed with patient and verbalized understanding.            Your medication list            Accurate as of May 20, 2025 10:54 AM. If you have any questions, ask your nurse or doctor.                CONTINUE taking these medications        Instructions Last Dose Given Next Dose Due   amLODIPine 5 MG tablet  Commonly known as: NORVASC      Take 1 tablet by mouth Daily.       atenolol 50 MG tablet  Commonly known as: TENORMIN      Take 1 tablet by mouth Daily.       atorvastatin 20 MG tablet  Commonly known as: LIPITOR      TAKE ONE TABLET BY MOUTH DAILY       cetirizine 10 MG tablet  Commonly known as: zyrTEC           cyclobenzaprine 10 MG tablet  Commonly known as: FLEXERIL      TAKE 1/2 TO 1 TABLET BY MOUTH DAILY AS NEEDED       multivitamin tablet tablet  Commonly known as: THERAGRAN      Take  by mouth.       OMEGA-3 FATTY ACIDS PO      Take 1 tablet by mouth Daily.       PROBIOTIC PRODUCT PO      Take 1 tablet by mouth Daily.       sildenafil 20 MG tablet  Commonly known as: REVATIO      TAKE 1 TO 5 TABLETS BY MOUTH ONCE DAILY AS NEEDED 1 TO 4 HOURS BEFORE SEXUAL ACTIVITY; DO NOT TAKE MORE THAN 5 TABLETS IN A 24 HOUR PERIOD                    Frederic HANDLEY  MD Dez  05/20/25  10:54 EDT

## 2025-06-30 RX ORDER — AMLODIPINE BESYLATE 5 MG/1
5 TABLET ORAL DAILY
Qty: 90 TABLET | Refills: 0 | Status: SHIPPED | OUTPATIENT
Start: 2025-06-30

## 2025-06-30 NOTE — TELEPHONE ENCOUNTER
LOV: 05/20/2025    Assessment/Plan:      Mild aortic valve stenosis per echo in April 2024.  Bicuspid aortic valve suggested.   History of EKG abnormality--left axis deviation noted without any other significant abnormalities.  Essential hypertension-on atenolol and amlodipine  Hypercholesterolemia on statin-excellent lipid panel on statin     Excellent blood pressure control.  Mild aortic valve stenosis-murmur [id number]-[id number]-jf echo in 1 year ( in 2026)  Continue current care     He is relocating to [place] and needs follow-up echocardiogram periodically and fu with cardiologist      Diagnosis and plan of care discussed with patient and verbalized understanding.         Thanks  Jessa ESPINOSA